# Patient Record
Sex: FEMALE | Race: WHITE | NOT HISPANIC OR LATINO | Employment: FULL TIME | URBAN - METROPOLITAN AREA
[De-identification: names, ages, dates, MRNs, and addresses within clinical notes are randomized per-mention and may not be internally consistent; named-entity substitution may affect disease eponyms.]

---

## 2023-03-08 DIAGNOSIS — R05.3 PERSISTENT COUGH: Primary | ICD-10-CM

## 2023-03-14 ENCOUNTER — TELEPHONE (OUTPATIENT)
Dept: PRIMARY CARE | Facility: CLINIC | Age: 58
End: 2023-03-14
Payer: COMMERCIAL

## 2023-03-14 DIAGNOSIS — J20.9 ACUTE BRONCHITIS, UNSPECIFIED ORGANISM: Primary | ICD-10-CM

## 2023-03-14 RX ORDER — AZITHROMYCIN 250 MG/1
TABLET, FILM COATED ORAL
Qty: 6 TABLET | Refills: 0 | Status: SHIPPED | OUTPATIENT
Start: 2023-03-14 | End: 2023-03-19

## 2023-04-11 ENCOUNTER — TELEMEDICINE (OUTPATIENT)
Dept: PRIMARY CARE | Facility: CLINIC | Age: 58
End: 2023-04-11
Payer: COMMERCIAL

## 2023-04-11 DIAGNOSIS — E78.5 DYSLIPIDEMIA: ICD-10-CM

## 2023-04-11 DIAGNOSIS — F51.01 PRIMARY INSOMNIA: Primary | ICD-10-CM

## 2023-04-11 PROBLEM — M25.561 RIGHT KNEE PAIN: Status: ACTIVE | Noted: 2023-04-11

## 2023-04-11 PROBLEM — H52.4 HYPEROPIA WITH PRESBYOPIA: Status: ACTIVE | Noted: 2023-04-11

## 2023-04-11 PROBLEM — R41.3 MEMORY DYSFUNCTION: Status: ACTIVE | Noted: 2023-04-11

## 2023-04-11 PROBLEM — M25.361 INSTABILITY OF RIGHT KNEE JOINT: Status: ACTIVE | Noted: 2023-04-11

## 2023-04-11 PROBLEM — L70.9 ACNE: Status: ACTIVE | Noted: 2023-04-11

## 2023-04-11 PROBLEM — R35.0 URINE FREQUENCY: Status: ACTIVE | Noted: 2023-04-11

## 2023-04-11 PROBLEM — H52.00 HYPEROPIA WITH PRESBYOPIA: Status: ACTIVE | Noted: 2023-04-11

## 2023-04-11 PROBLEM — F41.9 ANXIETY DISORDER: Status: ACTIVE | Noted: 2023-04-11

## 2023-04-11 PROBLEM — B37.31 VAGINAL YEAST INFECTION: Status: ACTIVE | Noted: 2023-04-11

## 2023-04-11 PROBLEM — N95.2 VAGINAL ATROPHY: Status: ACTIVE | Noted: 2023-04-11

## 2023-04-11 PROBLEM — L25.9 CONTACT DERMATITIS AND ECZEMA: Status: ACTIVE | Noted: 2023-04-11

## 2023-04-11 PROBLEM — E55.9 MILD VITAMIN D DEFICIENCY: Status: ACTIVE | Noted: 2023-04-11

## 2023-04-11 PROBLEM — M75.81 RIGHT ROTATOR CUFF TENDINITIS: Status: ACTIVE | Noted: 2023-04-11

## 2023-04-11 PROBLEM — N95.0 POSTMENOPAUSAL BLEEDING: Status: ACTIVE | Noted: 2023-04-11

## 2023-04-11 PROCEDURE — 99213 OFFICE O/P EST LOW 20 MIN: CPT | Performed by: INTERNAL MEDICINE

## 2023-04-11 RX ORDER — SERTRALINE HYDROCHLORIDE 50 MG/1
1.5 TABLET, FILM COATED ORAL DAILY
COMMUNITY
Start: 2021-12-06 | End: 2023-07-11 | Stop reason: SDUPTHER

## 2023-04-11 RX ORDER — DICLOFENAC SODIUM 75 MG/1
1 TABLET, DELAYED RELEASE ORAL DAILY
COMMUNITY
Start: 2023-01-11 | End: 2023-10-10 | Stop reason: ALTCHOICE

## 2023-04-11 RX ORDER — ZOLPIDEM TARTRATE 10 MG/1
1 TABLET ORAL NIGHTLY PRN
COMMUNITY
End: 2023-04-11 | Stop reason: SDUPTHER

## 2023-04-11 RX ORDER — BENZONATATE 200 MG/1
CAPSULE ORAL
COMMUNITY
Start: 2023-03-01 | End: 2023-10-10 | Stop reason: ALTCHOICE

## 2023-04-11 RX ORDER — ZOSTER VACCINE RECOMBINANT, ADJUVANTED 50 MCG/0.5
KIT INTRAMUSCULAR
COMMUNITY
Start: 2022-06-09 | End: 2023-12-22 | Stop reason: WASHOUT

## 2023-04-11 RX ORDER — CLINDAMYCIN AND BENZOYL PEROXIDE 1 %-5 %
KIT TOPICAL DAILY PRN
COMMUNITY
Start: 2022-12-14

## 2023-04-11 RX ORDER — CLINDAMYCIN AND BENZOYL PEROXIDE 10; 50 MG/G; MG/G
GEL TOPICAL 2 TIMES DAILY
COMMUNITY
Start: 2022-09-06 | End: 2023-10-10 | Stop reason: ALTCHOICE

## 2023-04-11 RX ORDER — ACETAMINOPHEN AND PHENYLEPHRINE HCL 325; 5 MG/1; MG/1
TABLET ORAL
COMMUNITY
Start: 2021-12-06

## 2023-04-11 RX ORDER — ZOLPIDEM TARTRATE 10 MG/1
10 TABLET ORAL NIGHTLY PRN
Qty: 30 TABLET | Refills: 0 | Status: SHIPPED | OUTPATIENT
Start: 2023-04-11 | End: 2023-07-11 | Stop reason: SDUPTHER

## 2023-04-11 NOTE — PROGRESS NOTES
Subjective   Jessica Jordan is a 57 y.o. female who presents for virtual visit.  HPI  Patient is a 57-year-old female known history of severe insomnia mild vitamin D deficiency anxiety disorder history of dyslipidemia last LDL was 165 mg deciliter on June 9, 2022.  Patient is here for virtual visit, she still does not have the link to [employer] therefore visit was on [email or web address].  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  Without sleep patient is a hard time concentrating, with zolpidem patient is clear and able to perform duties without difficulties.  Review of Systems  10 system review pertinent as above  Objective   Virtual  There were no vitals taken for this visit.   Physical Exam    Virtual    Assessment/Plan     Primary insomnia  Takes zolpidem without side effects to report  Takes result and responsibly  Able to function and work as an RN  Full duties no complication    OARRS:  Aramis Olivo MD on 4/11/2023  4:40 PM  I have personally reviewed the OARRS report for Jessica Jordan. I have considered the risks of abuse, dependence, addiction and diversion    Is the patient prescribed a combination of a benzodiazepine and opioid?  Yes, I feel it is clincially indicated to continue the medication and have discussed with the patient risks/benefits/alternatives.    Last Urine Drug Screen / ordered today: December 16, 2022  Recent Results (from the past 82148 hour(s))   Zolpidem Confirmation, Urine    Collection Time: 12/16/22 10:14 AM   Result Value Ref Range    Zolpidem <25 Cutoff <25 ng/mL    Zolpidem Metabolite (ZCA) 748 (A) Cutoff <25 ng/mL   Drug Screen, Urine With Reflex to Confirmation    Collection Time: 12/16/22 10:14 AM   Result Value Ref Range    DRUG SCREEN COMMENT URINE SEE BELOW     Amphetamine Screen, Urine PRESUMPTIVE NEGATIVE NEGATIVE    Barbiturate Screen, Urine PRESUMPTIVE  NEGATIVE NEGATIVE    BENZODIAZEPINE (PRESENCE) IN URINE BY SCREEN METHOD PRESUMPTIVE NEGATIVE NEGATIVE    Cannabinoid Screen, Urine PRESUMPTIVE NEGATIVE NEGATIVE    Cocaine Screen, Urine PRESUMPTIVE NEGATIVE NEGATIVE    Fentanyl, Ur PRESUMPTIVE NEGATIVE NEGATIVE    Methadone Screen, Urine PRESUMPTIVE NEGATIVE NEGATIVE    Opiate Screen, Urine PRESUMPTIVE NEGATIVE NEGATIVE    Oxycodone Screen, Ur PRESUMPTIVE NEGATIVE NEGATIVE    PCP Screen, Urine PRESUMPTIVE NEGATIVE NEGATIVE   OPIATE/OPIOID/BENZO PRESCRIPTION COMPLIANCE    Collection Time: 12/06/21  1:05 PM   Result Value Ref Range    DRUG SCREEN COMMENT URINE SEE BELOW     Creatine, Urine 21.7 mg/dL    Amphetamine Screen, Urine PRESUMPTIVE NEGATIVE NEGATIVE    Barbiturate Screen, Urine PRESUMPTIVE NEGATIVE NEGATIVE    Cannabinoid Screen, Urine PRESUMPTIVE NEGATIVE NEGATIVE    Cocaine Screen, Urine PRESUMPTIVE NEGATIVE NEGATIVE    PCP Screen, Urine PRESUMPTIVE NEGATIVE NEGATIVE    7-Aminoclonazepam <25 Cutoff <25 ng/mL    Alpha-Hydroxyalprazolam <25 Cutoff <25 ng/mL    Alpha-Hydroxymidazolam <25 Cutoff <25 ng/mL    Alprazolam <25 Cutoff <25 ng/mL    Chlordiazepoxide <25 Cutoff <25 ng/mL    Clonazepam <25 Cutoff <25 ng/mL    Diazepam <25 Cutoff <25 ng/mL    Lorazepam <25 Cutoff <25 ng/mL    Midazolam <25 Cutoff <25 ng/mL    Nordiazepam <25 Cutoff <25 ng/mL    Oxazepam <25 Cutoff <25 ng/mL    Temazepam <25 Cutoff <25 ng/mL    Zolpidem <25 Cutoff <25 ng/mL    Zolpidem Metabolite (ZCA) 323 (A) Cutoff <25 ng/mL    6-Acetylmorphine <25 Cutoff <25 ng/mL    Codeine <50 Cutoff <50 ng/mL    Hydrocodone <25 Cutoff <25 ng/mL    Hydromorphone <25 Cutoff <25 ng/mL    Morphine Urine <50 Cutoff <50 ng/mL    Norhydrocodone <25 Cutoff <25 ng/mL    Noroxycodone <25 Cutoff <25 ng/mL    Oxycodone <25 Cutoff <25 ng/mL    Oxymorphone <25 Cutoff <25 ng/mL    Tramadol <50 Cutoff <50 ng/mL    O-Desmethyltramadol <50 Cutoff <50 ng/mL    Fentanyl <2.5 Cutoff<2.5 ng/mL    Norfentanyl <2.5  Cutoff<2.5 ng/mL    METHADONE CONFIRMATION,URINE <25 Cutoff <25 ng/mL    EDDP <25 Cutoff <25 ng/mL     Results are as expected.     Controlled Substance Agreement:  Date of the Last Agreement: December 16, 2022  Reviewed Controlled Substance Agreement including but not limited to the benefits, risks, and alternatives to treatment with a Controlled Substance medication(s).    Sleep Aids:   What is the patient's goal of therapy?   yes  Is this being achieved with current treatment? yes    Activities of Daily Living:   Is your overall impression that this patient is benefiting (symptom reduction outweighs side effects) from sleep aid therapy? Yes     1. Physical Functioning: Better  2. Family Relationship: Better  3. Social Relationship: Better  4. Mood: Better  5. Sleep Patterns: Better  6. Overall Function: Better    Problem List Items Addressed This Visit          Nervous    Primary insomnia - Primary    Relevant Medications    zolpidem (Ambien) 10 mg tablet       Other    Dyslipidemia         Aramis Olivo MD

## 2023-07-06 NOTE — PROGRESS NOTES
Subjective   Jessica Jordan is a 57 y.o. female who presents for patient is here for physical exam.  HPI  57-year-old female known history of insomnia chronic on zolpidem, dyslipidemia vitamin D deficiency gastroesophageal reflux disease, dyslipidemia takes medication prescribed here for follow-up, voices no major medical pain denies chest pain shortness of breath fever chills nausea vomiting constipation diarrhea dysuria urgency frequency.  Patient is in need of fasting blood work.  Review of Systems  10 system review pertinent as above  Objective     Visit Vitals  /82   Pulse 74   Temp 36.9 °C (98.4 °F)   Resp 15      Physical Exam  HEENT: Atraumatic normocephalic the pupils are equal and round and reactive to light the sclerae nonicteric extraocular motion are intact.  Neck: Is supple without JVD no carotid bruits the trachea is midline there are no masses pulses are equal and bilateral with normal upstroke.  Skin: Normal.  Skin good texture.  Moist.  Good turgor.  No lesions, no rashes.  Lymph: No lymphadenopathy appreciated, no masses, no lesions  Lungs: Are clear to auscultation and percussion, good breath sounds bilaterally, no rhonchi, no wheezing, good diaphragmatic excursion.  Heart: Normal rate and normal rhythm S1, S2, no S3, no gallop, murmur or rub.  Abdomen: Soft, nontender, no organomegaly, good bowel sounds.    Extremities: Full range of motion, good pulses bilateral.  No cyanosis, no clubbing or edema.  Neuro: Cranial nerves II-XII are grossly intact there is no sensory or motor deficits.  Able to move all extremities.      Assessment/Plan     Physical exam    Patient is here for follow-up fasting blood work    CBC BMP lipids AST ALT vitamin D 25-hydroxy    Colonoscopy 09/07/2017 Next 2027  Mammogram b Yearly Dr Davis  Bone density needed    Continue with the low-fat, low-cholesterol diet,  I recommended Mediterranean diet, which include fish, chicken, vegetables and olive oil  Exercise  daily for 30 minutes at least 3 times a week  Continue home medications  We spoke about possible treatment pending blood work  Only past hx of smoking quit 2005  Father MI age 78    CACS is Zero    Vitamin D deficiency  Continue replacement D3 2000 units daily    Chronic insomnia  On Ambien 10 mg at bedtime  Able to sleep and function well the next day  No side effects reported tolerating well    OARRS:  No data recorded  I have personally reviewed the OARRS report for Jessica Jordan. I have considered the risks of abuse, dependence, addiction and diversion and I believe that it is clinically appropriate for Jessica Jordan to be prescribed this medication    Is the patient prescribed a combination of a benzodiazepine and opioid?  No    Last Urine Drug Screen / ordered today: Yes  Recent Results (from the past 06545 hour(s))   Zolpidem Confirmation, Urine    Collection Time: 12/16/22 10:14 AM   Result Value Ref Range    Zolpidem <25 Cutoff <25 ng/mL    Zolpidem Metabolite (ZCA) 748 (A) Cutoff <25 ng/mL   Drug Screen, Urine With Reflex to Confirmation    Collection Time: 12/16/22 10:14 AM   Result Value Ref Range    DRUG SCREEN COMMENT URINE SEE BELOW     Amphetamine Screen, Urine PRESUMPTIVE NEGATIVE NEGATIVE    Barbiturate Screen, Urine PRESUMPTIVE NEGATIVE NEGATIVE    BENZODIAZEPINE (PRESENCE) IN URINE BY SCREEN METHOD PRESUMPTIVE NEGATIVE NEGATIVE    Cannabinoid Screen, Urine PRESUMPTIVE NEGATIVE NEGATIVE    Cocaine Screen, Urine PRESUMPTIVE NEGATIVE NEGATIVE    Fentanyl, Ur PRESUMPTIVE NEGATIVE NEGATIVE    Methadone Screen, Urine PRESUMPTIVE NEGATIVE NEGATIVE    Opiate Screen, Urine PRESUMPTIVE NEGATIVE NEGATIVE    Oxycodone Screen, Ur PRESUMPTIVE NEGATIVE NEGATIVE    PCP Screen, Urine PRESUMPTIVE NEGATIVE NEGATIVE   OPIATE/OPIOID/BENZO PRESCRIPTION COMPLIANCE    Collection Time: 12/06/21  1:05 PM   Result Value Ref Range    DRUG SCREEN COMMENT URINE SEE BELOW     Creatine, Urine 21.7 mg/dL    Amphetamine Screen,  Urine PRESUMPTIVE NEGATIVE NEGATIVE    Barbiturate Screen, Urine PRESUMPTIVE NEGATIVE NEGATIVE    Cannabinoid Screen, Urine PRESUMPTIVE NEGATIVE NEGATIVE    Cocaine Screen, Urine PRESUMPTIVE NEGATIVE NEGATIVE    PCP Screen, Urine PRESUMPTIVE NEGATIVE NEGATIVE    7-Aminoclonazepam <25 Cutoff <25 ng/mL    Alpha-Hydroxyalprazolam <25 Cutoff <25 ng/mL    Alpha-Hydroxymidazolam <25 Cutoff <25 ng/mL    Alprazolam <25 Cutoff <25 ng/mL    Chlordiazepoxide <25 Cutoff <25 ng/mL    Clonazepam <25 Cutoff <25 ng/mL    Diazepam <25 Cutoff <25 ng/mL    Lorazepam <25 Cutoff <25 ng/mL    Midazolam <25 Cutoff <25 ng/mL    Nordiazepam <25 Cutoff <25 ng/mL    Oxazepam <25 Cutoff <25 ng/mL    Temazepam <25 Cutoff <25 ng/mL    Zolpidem <25 Cutoff <25 ng/mL    Zolpidem Metabolite (ZCA) 323 (A) Cutoff <25 ng/mL    6-Acetylmorphine <25 Cutoff <25 ng/mL    Codeine <50 Cutoff <50 ng/mL    Hydrocodone <25 Cutoff <25 ng/mL    Hydromorphone <25 Cutoff <25 ng/mL    Morphine Urine <50 Cutoff <50 ng/mL    Norhydrocodone <25 Cutoff <25 ng/mL    Noroxycodone <25 Cutoff <25 ng/mL    Oxycodone <25 Cutoff <25 ng/mL    Oxymorphone <25 Cutoff <25 ng/mL    Tramadol <50 Cutoff <50 ng/mL    O-Desmethyltramadol <50 Cutoff <50 ng/mL    Fentanyl <2.5 Cutoff<2.5 ng/mL    Norfentanyl <2.5 Cutoff<2.5 ng/mL    METHADONE CONFIRMATION,URINE <25 Cutoff <25 ng/mL    EDDP <25 Cutoff <25 ng/mL     Results are as expected.     Controlled Substance Agreement:  Date of the Last Agreement: 07/11/2023  Reviewed Controlled Substance Agreement including but not limited to the benefits, risks, and alternatives to treatment with a Controlled Substance medication(s).    Sleep Aids:   What is the patient's goal of therapy? Severe insomnia  Is this being achieved with current treatment?   yes    Activities of Daily Living:   Is your overall impression that this patient is benefiting (symptom reduction outweighs side effects) from sleep aid therapy? Yes     1. Physical Functioning:  Better  2. Family Relationship: Better  3. Social Relationship: Better  4. Mood: Better  5. Sleep Patterns: Better  6. Overall Function: Better      Addended note, patient came in for physical exam of updated note.      Problem List Items Addressed This Visit       Dyslipidemia - Primary    Relevant Orders    CBC    Basic Metabolic Panel    Lipid Panel    AST    ALT    Mild vitamin D deficiency    Relevant Orders    Basic Metabolic Panel    Primary insomnia    Relevant Medications    zolpidem (Ambien) 10 mg tablet    Other Relevant Orders    Basic Metabolic Panel     Other Visit Diagnoses       Medication management        Relevant Orders    CBC    Drug Screen, Urine With Reflex to Confirmation    Vitamin D insufficiency        Relevant Orders    Vitamin D 25-Hydroxy,Total    Depression, unspecified depression type        Relevant Medications    sertraline (Zoloft) 50 mg tablet              Aramis Olivo MD

## 2023-07-11 ENCOUNTER — OFFICE VISIT (OUTPATIENT)
Dept: PRIMARY CARE | Facility: CLINIC | Age: 58
End: 2023-07-11
Payer: COMMERCIAL

## 2023-07-11 VITALS
BODY MASS INDEX: 22.66 KG/M2 | HEART RATE: 74 BPM | DIASTOLIC BLOOD PRESSURE: 82 MMHG | TEMPERATURE: 98.4 F | HEIGHT: 61 IN | RESPIRATION RATE: 15 BRPM | WEIGHT: 120 LBS | SYSTOLIC BLOOD PRESSURE: 126 MMHG

## 2023-07-11 DIAGNOSIS — Z79.899 MEDICATION MANAGEMENT: ICD-10-CM

## 2023-07-11 DIAGNOSIS — F32.A DEPRESSION, UNSPECIFIED DEPRESSION TYPE: ICD-10-CM

## 2023-07-11 DIAGNOSIS — Z00.00 PHYSICAL EXAM: ICD-10-CM

## 2023-07-11 DIAGNOSIS — E55.9 MILD VITAMIN D DEFICIENCY: ICD-10-CM

## 2023-07-11 DIAGNOSIS — E78.5 DYSLIPIDEMIA: Primary | ICD-10-CM

## 2023-07-11 DIAGNOSIS — E55.9 VITAMIN D INSUFFICIENCY: ICD-10-CM

## 2023-07-11 DIAGNOSIS — F51.01 PRIMARY INSOMNIA: ICD-10-CM

## 2023-07-11 LAB
AMPHETAMINE (PRESENCE) IN URINE BY SCREEN METHOD: NORMAL
BARBITURATES PRESENCE IN URINE BY SCREEN METHOD: NORMAL
BENZODIAZEPINE (PRESENCE) IN URINE BY SCREEN METHOD: NORMAL
CANNABINOIDS IN URINE BY SCREEN METHOD: NORMAL
COCAINE (PRESENCE) IN URINE BY SCREEN METHOD: NORMAL
DRUG SCREEN COMMENT URINE: NORMAL
FENTANYL URINE: NORMAL
METHADONE (PRESENCE) IN URINE BY SCREEN METHOD: NORMAL
OPIATES (PRESENCE) IN URINE BY SCREEN METHOD: NORMAL
OXYCODONE (PRESENCE) IN URINE BY SCREEN METHOD: NORMAL
PHENCYCLIDINE (PRESENCE) IN URINE BY SCREEN METHOD: NORMAL

## 2023-07-11 PROCEDURE — 84460 ALANINE AMINO (ALT) (SGPT): CPT | Performed by: INTERNAL MEDICINE

## 2023-07-11 PROCEDURE — 99396 PREV VISIT EST AGE 40-64: CPT | Performed by: INTERNAL MEDICINE

## 2023-07-11 PROCEDURE — 82306 VITAMIN D 25 HYDROXY: CPT | Performed by: INTERNAL MEDICINE

## 2023-07-11 PROCEDURE — 84450 TRANSFERASE (AST) (SGOT): CPT | Performed by: INTERNAL MEDICINE

## 2023-07-11 PROCEDURE — 85025 COMPLETE CBC W/AUTO DIFF WBC: CPT | Performed by: INTERNAL MEDICINE

## 2023-07-11 PROCEDURE — 80307 DRUG TEST PRSMV CHEM ANLYZR: CPT

## 2023-07-11 PROCEDURE — 80061 LIPID PANEL: CPT | Performed by: INTERNAL MEDICINE

## 2023-07-11 PROCEDURE — 1036F TOBACCO NON-USER: CPT | Performed by: INTERNAL MEDICINE

## 2023-07-11 PROCEDURE — 80048 BASIC METABOLIC PNL TOTAL CA: CPT | Performed by: INTERNAL MEDICINE

## 2023-07-11 RX ORDER — SERTRALINE HYDROCHLORIDE 50 MG/1
75 TABLET, FILM COATED ORAL DAILY
Qty: 135 TABLET | Refills: 3 | Status: SHIPPED | OUTPATIENT
Start: 2023-07-11 | End: 2023-07-11

## 2023-07-11 RX ORDER — ZOLPIDEM TARTRATE 10 MG/1
10 TABLET ORAL NIGHTLY PRN
Qty: 30 TABLET | Refills: 0 | Status: SHIPPED | OUTPATIENT
Start: 2023-07-11 | End: 2023-07-11

## 2023-07-11 ASSESSMENT — PAIN SCALES - GENERAL: PAINLEVEL: 0-NO PAIN

## 2023-10-10 ENCOUNTER — TELEMEDICINE (OUTPATIENT)
Dept: PRIMARY CARE | Facility: CLINIC | Age: 58
End: 2023-10-10
Payer: COMMERCIAL

## 2023-10-10 ENCOUNTER — PHARMACY VISIT (OUTPATIENT)
Dept: PHARMACY | Facility: CLINIC | Age: 58
End: 2023-10-10
Payer: COMMERCIAL

## 2023-10-10 DIAGNOSIS — F51.01 PRIMARY INSOMNIA: ICD-10-CM

## 2023-10-10 PROCEDURE — RXMED WILLOW AMBULATORY MEDICATION CHARGE

## 2023-10-10 PROCEDURE — 99213 OFFICE O/P EST LOW 20 MIN: CPT | Performed by: INTERNAL MEDICINE

## 2023-10-10 RX ORDER — ZOLPIDEM TARTRATE 10 MG/1
10 TABLET ORAL NIGHTLY PRN
Qty: 30 TABLET | Refills: 0 | Status: SHIPPED | OUTPATIENT
Start: 2023-10-10 | End: 2023-11-12 | Stop reason: SDUPTHER

## 2023-10-10 RX ORDER — MIRTAZAPINE 7.5 MG/1
7.5 TABLET, FILM COATED ORAL NIGHTLY
Qty: 30 TABLET | Refills: 2 | Status: SHIPPED | OUTPATIENT
Start: 2023-10-10 | End: 2024-01-02 | Stop reason: SDUPTHER

## 2023-10-10 NOTE — PROGRESS NOTES
Subjective   Jessica Jordan is a 58 y.o. female who presents for virtual follow-up.  HPI  58-year-old female History of dyslipidemia, anxiety and depression is here for virtual visit, requiring refill on the zolpidem.  Without zolpidem patient has difficulty falling asleep staying asleep and has severe fatigue the next day, she tried 5 mg of zolpidem was able to sleep about half the night and then wakes up and unable to return to sleep.  She is currently on 10 mg of zolpidem, we did explore the possibility of maybe trying Remeron as a different agent.  Patient voices no other complaint denies chest pain shortness of breath fever chills nausea vomiting.  Patient also wished to discuss elevated cholesterol in the setting of a cardiac calcium score.  Review of Systems  10 system review pertinent as above  Objective   Virtual visit  There were no vitals taken for this visit.   Physical Exam  Virtual visit      Assessment/Plan     Virtual visit  Insomnia  On zolpidem 10 mg nightly  Tried 5 mg nightly with poor results  Unable to remain asleep restless tired the next day  We spoke about trying a different agent  We will give her Remeron 7.5 mg at bedtime  Patient will try and keep me posted  Patient well aware not to use Remeron and zolpidem at the same time  Patient will report how Remeron works  If it works she will stop zolpidem    Continue with the low-fat, low-cholesterol diet,  I recommended Mediterranean diet, which include fish, chicken, vegetables and olive oil  Exercise daily for 30 minutes at least 3 times a week  Continue home medications  We spoke about the risk of heart disease  There is no premature heart disease in the family  No premature cardiovascular disease  No diabetes no smoking  Cardiac calcium score is 0 score July 22, 2020  At this juncture patient remain on diet exercise  We spoke about Mediterranean type diet  To include fish chicken vegetables and olive oil    Problem List Items Addressed This  Visit       Primary insomnia    Relevant Medications    zolpidem (Ambien) 10 mg tablet    mirtazapine (Remeron) 7.5 mg tablet         Aramis Olivo MD

## 2023-10-11 ENCOUNTER — TELEPHONE (OUTPATIENT)
Dept: PRIMARY CARE | Facility: CLINIC | Age: 58
End: 2023-10-11
Payer: COMMERCIAL

## 2023-10-11 PROBLEM — F32.A DEPRESSION: Status: ACTIVE | Noted: 2023-10-11

## 2023-10-11 PROBLEM — Z79.899 MEDICATION MANAGEMENT: Status: ACTIVE | Noted: 2023-10-11

## 2023-10-11 PROBLEM — Z00.00 PHYSICAL EXAM: Status: ACTIVE | Noted: 2023-10-11

## 2023-11-02 ENCOUNTER — LAB REQUISITION (OUTPATIENT)
Dept: LAB | Facility: HOSPITAL | Age: 58
End: 2023-11-02
Payer: COMMERCIAL

## 2023-11-02 DIAGNOSIS — J00 ACUTE NASOPHARYNGITIS (COMMON COLD): ICD-10-CM

## 2023-11-02 PROCEDURE — 87635 SARS-COV-2 COVID-19 AMP PRB: CPT

## 2023-11-03 ENCOUNTER — LAB REQUISITION (OUTPATIENT)
Dept: LAB | Facility: HOSPITAL | Age: 58
End: 2023-11-03
Payer: COMMERCIAL

## 2023-11-03 DIAGNOSIS — J00 ACUTE NASOPHARYNGITIS (COMMON COLD): ICD-10-CM

## 2023-11-03 LAB — SARS-COV-2 RNA RESP QL NAA+PROBE: NOT DETECTED

## 2023-11-06 ENCOUNTER — PHARMACY VISIT (OUTPATIENT)
Dept: PHARMACY | Facility: CLINIC | Age: 58
End: 2023-11-06
Payer: COMMERCIAL

## 2023-11-06 PROCEDURE — RXMED WILLOW AMBULATORY MEDICATION CHARGE

## 2023-11-12 DIAGNOSIS — F51.01 PRIMARY INSOMNIA: ICD-10-CM

## 2023-11-13 ENCOUNTER — PHARMACY VISIT (OUTPATIENT)
Dept: PHARMACY | Facility: CLINIC | Age: 58
End: 2023-11-13
Payer: COMMERCIAL

## 2023-11-13 PROCEDURE — RXMED WILLOW AMBULATORY MEDICATION CHARGE

## 2023-11-13 RX ORDER — ZOLPIDEM TARTRATE 10 MG/1
10 TABLET ORAL NIGHTLY PRN
Qty: 30 TABLET | Refills: 0 | Status: SHIPPED | OUTPATIENT
Start: 2023-11-13 | End: 2023-12-17 | Stop reason: SDUPTHER

## 2023-11-24 ENCOUNTER — PHARMACY VISIT (OUTPATIENT)
Dept: PHARMACY | Facility: CLINIC | Age: 58
End: 2023-11-24
Payer: COMMERCIAL

## 2023-11-24 PROCEDURE — RXMED WILLOW AMBULATORY MEDICATION CHARGE

## 2023-11-24 RX ORDER — CLINDAMYCIN HYDROCHLORIDE 150 MG/1
300 CAPSULE ORAL EVERY 6 HOURS
Qty: 40 CAPSULE | Refills: 0 | OUTPATIENT
Start: 2023-11-24 | End: 2023-12-22 | Stop reason: WASHOUT

## 2023-11-24 RX ORDER — CHLORHEXIDINE GLUCONATE ORAL RINSE 1.2 MG/ML
15 SOLUTION DENTAL 2 TIMES DAILY
Qty: 473 ML | Refills: 2 | OUTPATIENT
Start: 2023-11-24 | End: 2023-12-22 | Stop reason: WASHOUT

## 2023-12-02 ENCOUNTER — PHARMACY VISIT (OUTPATIENT)
Dept: PHARMACY | Facility: CLINIC | Age: 58
End: 2023-12-02
Payer: COMMERCIAL

## 2023-12-02 PROCEDURE — RXMED WILLOW AMBULATORY MEDICATION CHARGE

## 2023-12-05 PROCEDURE — RXMED WILLOW AMBULATORY MEDICATION CHARGE

## 2023-12-06 ENCOUNTER — PHARMACY VISIT (OUTPATIENT)
Dept: PHARMACY | Facility: CLINIC | Age: 58
End: 2023-12-06
Payer: COMMERCIAL

## 2023-12-06 PROCEDURE — RXMED WILLOW AMBULATORY MEDICATION CHARGE

## 2023-12-17 DIAGNOSIS — F51.01 PRIMARY INSOMNIA: ICD-10-CM

## 2023-12-18 PROCEDURE — RXMED WILLOW AMBULATORY MEDICATION CHARGE

## 2023-12-18 RX ORDER — ZOLPIDEM TARTRATE 10 MG/1
10 TABLET ORAL NIGHTLY PRN
Qty: 30 TABLET | Refills: 0 | Status: SHIPPED | OUTPATIENT
Start: 2023-12-18 | End: 2024-06-19

## 2023-12-20 ENCOUNTER — PHARMACY VISIT (OUTPATIENT)
Dept: PHARMACY | Facility: CLINIC | Age: 58
End: 2023-12-20
Payer: COMMERCIAL

## 2023-12-22 ENCOUNTER — OFFICE VISIT (OUTPATIENT)
Dept: PRIMARY CARE | Facility: CLINIC | Age: 58
End: 2023-12-22
Payer: COMMERCIAL

## 2023-12-22 VITALS
SYSTOLIC BLOOD PRESSURE: 129 MMHG | WEIGHT: 122 LBS | BODY MASS INDEX: 23.03 KG/M2 | HEART RATE: 83 BPM | DIASTOLIC BLOOD PRESSURE: 84 MMHG | HEIGHT: 61 IN | TEMPERATURE: 97.9 F

## 2023-12-22 DIAGNOSIS — E55.9 VITAMIN D INSUFFICIENCY: ICD-10-CM

## 2023-12-22 DIAGNOSIS — E78.5 DYSLIPIDEMIA: ICD-10-CM

## 2023-12-22 DIAGNOSIS — F51.01 PRIMARY INSOMNIA: ICD-10-CM

## 2023-12-22 DIAGNOSIS — Z51.81 MEDICATION MONITORING ENCOUNTER: ICD-10-CM

## 2023-12-22 DIAGNOSIS — F32.A DEPRESSION, UNSPECIFIED DEPRESSION TYPE: ICD-10-CM

## 2023-12-22 DIAGNOSIS — F41.1 GENERALIZED ANXIETY DISORDER: ICD-10-CM

## 2023-12-22 DIAGNOSIS — Z76.89 ENCOUNTER TO ESTABLISH CARE: Primary | ICD-10-CM

## 2023-12-22 PROCEDURE — 99214 OFFICE O/P EST MOD 30 MIN: CPT | Performed by: INTERNAL MEDICINE

## 2023-12-22 PROCEDURE — 1036F TOBACCO NON-USER: CPT | Performed by: INTERNAL MEDICINE

## 2023-12-22 RX ORDER — SERTRALINE HYDROCHLORIDE 50 MG/1
75 TABLET, FILM COATED ORAL DAILY
Qty: 135 TABLET | Refills: 3 | Status: SHIPPED | OUTPATIENT
Start: 2023-12-22 | End: 2024-12-21

## 2023-12-22 ASSESSMENT — ENCOUNTER SYMPTOMS
FEVER: 0
SHORTNESS OF BREATH: 0
POLYDIPSIA: 0
PALPITATIONS: 0
COUGH: 0
CHILLS: 0

## 2023-12-22 NOTE — PROGRESS NOTES
Subjective   Patient ID: Jessica Jordan is a 58 y.o. female who presents for [facility].    59yo F presents to [facility] for care.      She was previously following with a physician in [facility] and the records are available to me at this time which were reviewed in detail including her blood work.  He has a history of hyperlipidemia specifically and triglycerides over 200.  She was counseled and educated about her total results as well as the benefits of reducing simple sugars, saturated fats as well as fish oil in her diet and lifestyle to benefit her triglycerides.  She does have sugars and fats in her diet can be modified and improved and will consider the fish oil strongly and plans to start it as her  already takes it.    Reviewed and discussed preventative health screening recommendations for colon cancer: 2017 - WNL. No polyp. No high risk FH, 10 yr    Reviewed and discussed preventative health recommendations for screening for cervical cancer and breast cancer: 3/23. WNL. OBGYN.     Specialists: Mona MEYERS    She has a history of insomnia on Ambien for approximately 10 years.  At the time of initiating it she recalls trying at least 2 different medications prior to Ambien only remembering trazodone specifically by name.  She been tolerating the Ambien fine for the last decade but has recently developed some concerns about memory and forgetfulness for which she saw neurology.  The concern is of course that Ambien may be contributing to this as it has been going to this is a long-term complication risk.  Her previous physician discussed with her the possibility of getting off the Ambien for an alternative medication but she has been hesitant to do it out of concerns that it may not control her insomnia.  She was provided counseling today as part of our encounter regarding the benefits of mirtazapine, its safety, and its very appropriate use for the treatment of insomnia.  We developed a  "plan for her to try to initiate that as a gentle dose while slowly weaning down the Ambien at the same time.  Complications of Ambien at this time include potential oversedation while the medication is at therapeutic levels overnight as well as potential memory loss.  Ideally she would end up transitioning to an alternative medication which we discussed today.    Otherwise her medications are stable and in good control refills provided of the Zoloft today.      There were no immediate concerns that she needed to discuss at this time we will plan to continue a follow-up plan of every 6 months as long as she remains on Ambien.  Should we successfully transition to an alternative medication this may allow us an opportunity to adjust our visits scheduled.             Review of Systems   Constitutional:  Negative for chills and fever.   Respiratory:  Negative for cough and shortness of breath.    Cardiovascular:  Negative for chest pain and palpitations.   Endocrine: Negative for polydipsia and polyuria.       Objective   /84 (BP Location: Left arm, Patient Position: Sitting, BP Cuff Size: Adult)   Pulse 83   Temp 36.6 °C (97.9 °F) (Temporal)   Ht 1.549 m (5' 1\")   Wt 55.3 kg (122 lb)   BMI 23.05 kg/m²     Physical Exam  Constitutional:       Appearance: Normal appearance.   HENT:      Head: Normocephalic and atraumatic.      Right Ear: Tympanic membrane normal.      Left Ear: Tympanic membrane normal.      Nose: Nose normal.      Mouth/Throat:      Mouth: Mucous membranes are moist.   Eyes:      Extraocular Movements: Extraocular movements intact.      Conjunctiva/sclera: Conjunctivae normal.      Pupils: Pupils are equal, round, and reactive to light.   Neck:      Thyroid: No thyroid mass, thyromegaly or thyroid tenderness.      Vascular: No carotid bruit.   Cardiovascular:      Rate and Rhythm: Normal rate and regular rhythm.      Heart sounds: No murmur heard.     No friction rub. No gallop.   Pulmonary: "      Effort: Pulmonary effort is normal. No respiratory distress.      Breath sounds: No wheezing, rhonchi or rales.   Abdominal:      General: Bowel sounds are normal.      Palpations: Abdomen is soft.      Tenderness: There is no abdominal tenderness. There is no guarding.      Hernia: No hernia is present.   Musculoskeletal:      Cervical back: Neck supple. No tenderness.      Right lower leg: No edema.      Left lower leg: No edema.   Lymphadenopathy:      Cervical: No cervical adenopathy.   Skin:     Coloration: Skin is not jaundiced.   Neurological:      General: No focal deficit present.      Mental Status: She is alert and oriented to person, place, and time.   Psychiatric:         Mood and Affect: Mood normal.         Assessment/Plan   Problem List Items Addressed This Visit             ICD-10-CM    Anxiety disorder F41.9    Dyslipidemia E78.5    Relevant Orders    Lipid Panel    Vitamin D 25-Hydroxy,Total (for eval of Vitamin D levels)    CBC    Comprehensive Metabolic Panel    Hemoglobin A1C    Vitamin D insufficiency E55.9    Relevant Orders    Lipid Panel    Vitamin D 25-Hydroxy,Total (for eval of Vitamin D levels)    CBC    Comprehensive Metabolic Panel    Hemoglobin A1C    Primary insomnia F51.01    Depression F32.A    Relevant Medications    sertraline (Zoloft) 50 mg tablet     Other Visit Diagnoses         Codes    Encounter to establish care    -  Primary Z76.89    Medication monitoring encounter     Z51.81    Relevant Orders    Lipid Panel    Vitamin D 25-Hydroxy,Total (for eval of Vitamin D levels)    CBC    Comprehensive Metabolic Panel    Hemoglobin A1C

## 2023-12-22 NOTE — PATIENT INSTRUCTIONS
For the insomnia I would recommend we follow the titration of medication as detailed below for both efficacy and safety:    -I would reduce your Ambien dose to 1/2 tablet, 5 mg nightly and simultaneously start Remeron 7.5 mg 1 tablet nightly and maintain on this combination for between 3 and 7 days depending on its efficacy for you.   -Once you complete that element, I would try stopping the Ambien at night and increasing the mirtazapine or Remeron to 15 mg at night as the sole assistance for insomnia and sleep.  That would require you to take 2 tablets of the 7.5 from your current prescription.  Based on your experience with this medication for at least a week we could then consider making adjustments as needed to a higher dose if it is not fully effective at helping you with insomnia and maintaining a high quality sleep habit.   -You may feel free to update me as to your progress with this treatment plan with a phone call or through [employer] in 2 to 3 weeks after you work through this stepwise plan.  If you have any concerns sooner please feel free to contact the office    See me again in 6 months. Call sooner as needed.

## 2024-01-02 DIAGNOSIS — F51.01 PRIMARY INSOMNIA: ICD-10-CM

## 2024-01-02 PROCEDURE — RXMED WILLOW AMBULATORY MEDICATION CHARGE

## 2024-01-04 PROCEDURE — RXMED WILLOW AMBULATORY MEDICATION CHARGE

## 2024-01-04 RX ORDER — MIRTAZAPINE 7.5 MG/1
7.5 TABLET, FILM COATED ORAL NIGHTLY
Qty: 30 TABLET | Refills: 2 | Status: SHIPPED | OUTPATIENT
Start: 2024-01-04 | End: 2024-03-24 | Stop reason: SDUPTHER

## 2024-01-06 ENCOUNTER — PHARMACY VISIT (OUTPATIENT)
Dept: PHARMACY | Facility: CLINIC | Age: 59
End: 2024-01-06
Payer: COMMERCIAL

## 2024-01-11 ENCOUNTER — APPOINTMENT (OUTPATIENT)
Dept: PRIMARY CARE | Facility: CLINIC | Age: 59
End: 2024-01-11
Payer: COMMERCIAL

## 2024-01-26 ENCOUNTER — OFFICE VISIT (OUTPATIENT)
Dept: ORTHOPEDIC SURGERY | Facility: CLINIC | Age: 59
End: 2024-01-26
Payer: COMMERCIAL

## 2024-01-26 DIAGNOSIS — G56.03 BILATERAL CARPAL TUNNEL SYNDROME: Primary | ICD-10-CM

## 2024-01-26 PROCEDURE — 99213 OFFICE O/P EST LOW 20 MIN: CPT | Performed by: ORTHOPAEDIC SURGERY

## 2024-01-26 PROCEDURE — 1036F TOBACCO NON-USER: CPT | Performed by: ORTHOPAEDIC SURGERY

## 2024-01-26 NOTE — PROGRESS NOTES
CHIEF COMPLAINT         Bilateral hand numbness    ASSESSMENT + PLAN    Bilateral carpal tunnel syndrome    The nature of carpal tunnel syndrome was reviewed, along with the slowly progressive natural history.  The options for management were reviewed, including night splinting, cortisone injection, or surgical carpal tunnel release.  The major benefits and risks of surgery were specifically reviewed, as was the postoperative rehabilitation course.    Before trying anything invasive, the patient wanted to try a course of night splinting with more appropriate splints.  Proper splint use was reviewed.  Followup in 4 weeks if things are not improving to their satisfaction.        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  This has been worsening over the last couple of weeks as she has been weaning down her Ambien dose at night.  She now wakes with a positive shake sign.  She recently obtained some shorty wrist splints which have been helping a little bit but not fully resolving findings.  Mild tingling during the day.  No noted weakness.  Not dropping objects.  No recalled trauma.    She is not diabetic or hypothyroid.  She does smoke.  She has anxiety.      REVIEW OF SYSTEMS       A 30-item multi-system Review Of Systems was obtained on today's intake form.  This was reviewed with the patient and is correct.  The pertinent positives and negatives are listed above.  The form has been scanned separately into the medical record.      PHYSICAL EXAM    Constitutional:    Appears stated age. Well-developed and well-nourished female in no acute distress.  Psychiatric:         Pleasant normal mood and affect. Behavior is appropriate for the situation.   Head:                   Normocephalic and atraumatic.  Eyes:                    Pupils are equal and round.  Cardiovascular:   2+ radial and ulnar pulses. Fingers well-perfused.  Respiratory:        Effort normal. No respiratory distress. Speaking in complete sentences.  Neurologic:       Alert and oriented to person, place, and time.  Skin:                Skin is intact, warm and dry.  Hematologic / Lymphatic:    No lymphedema or lymphangitis.    Extremities / Musculoskeletal:                      Skin of both hands and wrists is intact with no erythema, ecchymosis, or diffuse swelling.  Normal skin drag and coloration.  Full composite finger flexion extension with full intrinsic plus minus posture.  5/5 APB and hand intrinsics with no wasting.  Positive Phalen and Durkan, more brisk on the right.  Negative Tinel at wrist and elbow.  Negative elbow flexion test.  Cervical range of motion is good and does not reproduce chief complaint.  Negative Cyrus.  Sensation intact to light touch in all distributions.  Capillary refill less than 2 seconds.  Symmetric wrist and forearm motion.      IMAGING / LABS / EMGs           None pertinent      Past Medical History:   Diagnosis Date    Acute candidiasis of vulva and vagina     Vaginal yeast infection    Allergic 1999    Anxiety     Depression     Encounter for surveillance of contraceptive pills     Oral contraceptive use    Hypertension 11/24/2023    Other conditions influencing health status     History of pregnancy    Urinary tract infection     Varicella     Visual impairment        Medication Documentation Review Audit       Reviewed by Bello Mar MD (Physician) on 12/22/23 at 0837      Medication Order Taking? Sig Documenting Provider Last Dose Status      Discontinued 12/22/23 0836   cholecalciferol, vitamin D3, 125 mcg (5,000 unit) tablet,disintegrating [id number]  Take by mouth. Historical Provider, MD  Active   clindamycin (Cleocin) 150 mg capsule [id number]  Take 2 capsules (300 mg) by mouth every 6 hours.   Active   clindamycin-benzoyl peroxide 1-5 % gel with pump [id number]    Stephen Batres MD  Active   mirtazapine (Remeron) 7.5 mg tablet [id number]  Take 1 tablet (7.5 mg) by mouth once daily at bedtime. Aramis Olivo MD  Active   sertraline (Zoloft) 50 mg tablet [id number]  TAKE 1 1/2 TABLETS BY MOUTH ONCE DAILY Aramis Olivo MD  Active     Discontinued 23 0837     Discontinued 23 1859   zolpidem (Ambien) 10 mg tablet [id number]  TAKE 1 TABLET BY MOUTH EVERY NIGHT AT BEDTIME AS NEEDED FOR SLEEP Aramis Olivo MD  Active     Discontinued 23 0836                    Allergies   Allergen Reactions    Penicillins Unknown and Anaphylaxis    Erythromycin Unknown    Levofloxacin Unknown    Tetracyclines Unknown       Social History     Socioeconomic History    Marital status:      Spouse name: Not on file    Number of children: Not on file    Years of education: Not on file    Highest education level: Not on file   Occupational History    Not on file   Tobacco Use    Smoking status: Former     Packs/day: 1.00     Years: 15.00     Additional pack years: 0.00     Total pack years: 15.00     Types: Cigarettes     Quit date: 2005     Years since quittin.0     Passive exposure: Never    Smokeless tobacco: Never   Substance and Sexual Activity    Alcohol use: Yes     Alcohol/week: 2.0 standard drinks of alcohol     Types: 2 Glasses of wine per week    Drug use: Never    Sexual activity: Yes     Partners: Male     Birth control/protection: Post-menopausal   Other Topics Concern    Not on file   Social History Narrative    Not on file     Social Determinants of Health     Financial Resource Strain: Not on file   Food Insecurity: Not on file   Transportation Needs: Not on file   Physical Activity: Not on file   Stress: Not on file   Social Connections: Not on file   Intimate Partner Violence: Not on file   Housing Stability: Not on file       Past Surgical History:   Procedure Laterality Date     SECTION, CLASSIC  2014     Section      SECTION, LOW TRANSVERSE  [id number]    CT ANGIO CORONARY ART WITH HEARTFLOW IF SCORE >30%  09/02/2020    CT HEART CORONARY ANGIOGRAM 9/2/2020 [facility] [place]    WISDOM TOOTH EXTRACTION           Electronically Signed      SONIYA Bennett MD      Orthopaedic Hand Surgery      [phone]

## 2024-01-26 NOTE — LETTER
February 10, 2024     Bello Mar MD  [address] [facility] [address]    Patient: Jessica Jordan   YOB: 1965   Date of Visit: 1/26/2024       Dear Dr. Bello Mar MD:    Thank you for referring Jessica Jordan to me for evaluation. Below are my notes for this consultation.  If you have questions, please do not hesitate to call me. I look forward to following your patient along with you.       Sincerely,     Ovidio Bennett MD      CC: No Recipients  ______________________________________________________________________________________    CHIEF COMPLAINT         Bilateral hand numbness    ASSESSMENT + PLAN    Bilateral carpal tunnel syndrome    The nature of carpal tunnel syndrome was reviewed, along with the slowly progressive natural history.  The options for management were reviewed, including night splinting, cortisone injection, or surgical carpal tunnel release.  The major benefits and risks of surgery were specifically reviewed, as was the postoperative rehabilitation course.    Before trying anything invasive, the patient wanted to try a course of night splinting with more appropriate splints.  Proper splint use was reviewed.  Followup in 4 weeks if things are not improving to their satisfaction.        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  This has been worsening over the last couple of weeks as she has been weaning down her Ambien dose at night.  She now wakes with a positive shake sign.  She recently obtained some shorty wrist splints which have been helping a little bit but not fully resolving findings.  Mild tingling during the day.  No noted weakness.  Not dropping objects.  No recalled trauma.    She is not diabetic or hypothyroid.  She does smoke.  She has anxiety.      REVIEW OF SYSTEMS       A 30-item multi-system  Review Of Systems was obtained on today's intake form.  This was reviewed with the patient and is correct.  The pertinent positives and negatives are listed above.  The form has been scanned separately into the medical record.      PHYSICAL EXAM    Constitutional:    Appears stated age. Well-developed and well-nourished female in no acute distress.  Psychiatric:         Pleasant normal mood and affect. Behavior is appropriate for the situation.   Head:                   Normocephalic and atraumatic.  Eyes:                    Pupils are equal and round.  Cardiovascular:  2+ radial and ulnar pulses. Fingers well-perfused.  Respiratory:        Effort normal. No respiratory distress. Speaking in complete sentences.  Neurologic:       Alert and oriented to person, place, and time.  Skin:                Skin is intact, warm and dry.  Hematologic / Lymphatic:    No lymphedema or lymphangitis.    Extremities / Musculoskeletal:                      Skin of both hands and wrists is intact with no erythema, ecchymosis, or diffuse swelling.  Normal skin drag and coloration.  Full composite finger flexion extension with full intrinsic plus minus posture.  5/5 APB and hand intrinsics with no wasting.  Positive Phalen and Durkan, more brisk on the right.  Negative Tinel at wrist and elbow.  Negative elbow flexion test.  Cervical range of motion is good and does not reproduce chief complaint.  Negative Cyrus.  Sensation intact to light touch in all distributions.  Capillary refill less than 2 seconds.  Symmetric wrist and forearm motion.      IMAGING / LABS / EMGs           None pertinent      Past Medical History:   Diagnosis Date   • Acute candidiasis of vulva and vagina     Vaginal yeast infection   • Allergic 1999   • Anxiety    • Depression    • Encounter for surveillance of contraceptive pills     Oral contraceptive use   • Hypertension 11/24/2023   • Other conditions influencing health status     History of pregnancy   •  Urinary tract infection    • Varicella    • Visual impairment        Medication Documentation Review Audit       Reviewed by Bello Mar MD (Physician) on 23 at 0837      Medication Order Taking? Sig Documenting Provider Last Dose Status      Discontinued 23 0836   cholecalciferol, vitamin D3, 125 mcg (5,000 unit) tablet,disintegrating [id number]  Take by mouth. Historical Provider, MD  Active   clindamycin (Cleocin) 150 mg capsule [id number]  Take 2 capsules (300 mg) by mouth every 6 hours.   Active   clindamycin-benzoyl peroxide 1-5 % gel with pump [id number]   Historical Provider, MD  Active   mirtazapine (Remeron) 7.5 mg tablet [id number]  Take 1 tablet (7.5 mg) by mouth once daily at bedtime. Aramis Olivo MD  Active   sertraline (Zoloft) 50 mg tablet [id number]  TAKE 1 1/2 TABLETS BY MOUTH ONCE DAILY Aramis Olivo MD  Active     Discontinued 23 0837     Discontinued 23 1859   zolpidem (Ambien) 10 mg tablet [id number]  TAKE 1 TABLET BY MOUTH EVERY NIGHT AT BEDTIME AS NEEDED FOR SLEEP Aramis Olivo MD  Active     Discontinued 23 0836                    Allergies   Allergen Reactions   • Penicillins Unknown and Anaphylaxis   • Erythromycin Unknown   • Levofloxacin Unknown   • Tetracyclines Unknown       Social History     Socioeconomic History   • Marital status:      Spouse name: Not on file   • Number of children: Not on file   • Years of education: Not on file   • Highest education level: Not on file   Occupational History   • Not on file   Tobacco Use   • Smoking status: Former     Packs/day: 1.00     Years: 15.00     Additional pack years: 0.00     Total pack years: 15.00     Types: Cigarettes     Quit date: 2005     Years since quittin.0     Passive exposure: Never   • Smokeless tobacco: Never   Substance and Sexual Activity   • Alcohol use: Yes     Alcohol/week: 2.0 standard drinks of alcohol     Types: 2 Glasses of wine per week   • Drug use: Never   • Sexual  activity: Yes     Partners: Male     Birth control/protection: Post-menopausal   Other Topics Concern   • Not on file   Social History Narrative   • Not on file     Social Determinants of Health     Financial Resource Strain: Not on file   Food Insecurity: Not on file   Transportation Needs: Not on file   Physical Activity: Not on file   Stress: Not on file   Social Connections: Not on file   Intimate Partner Violence: Not on file   Housing Stability: Not on file       Past Surgical History:   Procedure Laterality Date   •  SECTION, CLASSIC  2014     Section   •  SECTION, LOW TRANSVERSE  [id number]   • CT ANGIO CORONARY ART WITH HEARTFLOW IF SCORE >30%  2020    CT HEART CORONARY ANGIOGRAM 2020 [facility] [place] [facility]CILLARY LEGACY   • WISDOM TOOTH EXTRACTION           Electronically Signed      SONIYA Bennett MD      Orthopaedic Hand Surgery      [phone]

## 2024-01-30 PROCEDURE — RXMED WILLOW AMBULATORY MEDICATION CHARGE

## 2024-02-01 ENCOUNTER — PHARMACY VISIT (OUTPATIENT)
Dept: PHARMACY | Facility: CLINIC | Age: 59
End: 2024-02-01
Payer: COMMERCIAL

## 2024-02-10 PROBLEM — G56.03 BILATERAL CARPAL TUNNEL SYNDROME: Status: ACTIVE | Noted: 2024-02-10

## 2024-02-21 ENCOUNTER — TELEPHONE (OUTPATIENT)
Dept: PRIMARY CARE | Facility: CLINIC | Age: 59
End: 2024-02-21
Payer: COMMERCIAL

## 2024-02-21 NOTE — TELEPHONE ENCOUNTER
This is the lowest dose of mirtazapine please attempt to use a increased dose by taking 2 pills instead of 1 which is more commonly therapeutic levels that able achieve the goals we are seeking for assistance with sleep.  We can update a prescription to the pharmacy and have already out or when proven to be successful for refills

## 2024-02-21 NOTE — TELEPHONE ENCOUNTER
Patient states that the Mirtazapine isn't working, still having issues swallowing and now dry mouth.

## 2024-02-28 PROCEDURE — RXMED WILLOW AMBULATORY MEDICATION CHARGE

## 2024-03-01 ENCOUNTER — PHARMACY VISIT (OUTPATIENT)
Dept: PHARMACY | Facility: CLINIC | Age: 59
End: 2024-03-01
Payer: COMMERCIAL

## 2024-03-12 DIAGNOSIS — G56.01 CARPAL TUNNEL SYNDROME, RIGHT: ICD-10-CM

## 2024-03-13 ENCOUNTER — PHARMACY VISIT (OUTPATIENT)
Dept: PHARMACY | Facility: CLINIC | Age: 59
End: 2024-03-13
Payer: COMMERCIAL

## 2024-03-13 PROCEDURE — RXMED WILLOW AMBULATORY MEDICATION CHARGE

## 2024-03-13 RX ORDER — CLINDAMYCIN HYDROCHLORIDE 150 MG/1
CAPSULE ORAL
Qty: 40 CAPSULE | Refills: 0 | OUTPATIENT
Start: 2024-03-13 | End: 2024-04-08 | Stop reason: ALTCHOICE

## 2024-03-14 PROBLEM — G56.01 CARPAL TUNNEL SYNDROME, RIGHT: Status: ACTIVE | Noted: 2024-03-12

## 2024-03-24 DIAGNOSIS — F51.01 PRIMARY INSOMNIA: ICD-10-CM

## 2024-03-25 ENCOUNTER — TELEPHONE (OUTPATIENT)
Dept: PRIMARY CARE | Facility: CLINIC | Age: 59
End: 2024-03-25
Payer: COMMERCIAL

## 2024-03-25 PROCEDURE — RXMED WILLOW AMBULATORY MEDICATION CHARGE

## 2024-03-25 RX ORDER — MIRTAZAPINE 7.5 MG/1
7.5 TABLET, FILM COATED ORAL NIGHTLY
Qty: 90 TABLET | Refills: 2 | Status: SHIPPED | OUTPATIENT
Start: 2024-03-25 | End: 2024-04-04 | Stop reason: ALTCHOICE

## 2024-03-25 NOTE — TELEPHONE ENCOUNTER
Patient states that the Remeron medication is making he body hurt all over her feet is swollen and she feel like a 89 year old woman Please call and advise patient states that sh will take a virtual appt if necessary

## 2024-04-01 ENCOUNTER — OFFICE VISIT (OUTPATIENT)
Dept: OPHTHALMOLOGY | Facility: CLINIC | Age: 59
End: 2024-04-01
Payer: COMMERCIAL

## 2024-04-01 DIAGNOSIS — H52.03 HYPEROPIA, BILATERAL: Primary | ICD-10-CM

## 2024-04-01 DIAGNOSIS — H52.4 PRESBYOPIA: ICD-10-CM

## 2024-04-01 PROCEDURE — 92015 DETERMINE REFRACTIVE STATE: CPT | Performed by: OPHTHALMOLOGY

## 2024-04-01 PROCEDURE — 92014 COMPRE OPH EXAM EST PT 1/>: CPT | Performed by: OPHTHALMOLOGY

## 2024-04-01 ASSESSMENT — VISUAL ACUITY
OD_CC+: -1
OD_CC: 20/25
OS_CC: 20/20
CORRECTION_TYPE: GLASSES
METHOD: SNELLEN - LINEAR

## 2024-04-01 ASSESSMENT — REFRACTION_MANIFEST
OS_SPHERE: +2.25
OD_SPHERE: +2.25
OD_ADD: +2.50
OS_CYLINDER: -0.50
OS_ADD: +2.50
OS_AXIS: 005

## 2024-04-01 ASSESSMENT — ENCOUNTER SYMPTOMS
HEMATOLOGIC/LYMPHATIC NEGATIVE: 0
ENDOCRINE NEGATIVE: 0
RESPIRATORY NEGATIVE: 0
NEUROLOGICAL NEGATIVE: 0
CARDIOVASCULAR NEGATIVE: 0
CONSTITUTIONAL NEGATIVE: 0
MUSCULOSKELETAL NEGATIVE: 0
ALLERGIC/IMMUNOLOGIC NEGATIVE: 0
GASTROINTESTINAL NEGATIVE: 0
EYES NEGATIVE: 0
PSYCHIATRIC NEGATIVE: 0

## 2024-04-01 ASSESSMENT — CUP TO DISC RATIO
OD_RATIO: .2
OS_RATIO: .2

## 2024-04-01 ASSESSMENT — CONF VISUAL FIELD
OS_INFERIOR_NASAL_RESTRICTION: 0
OS_INFERIOR_TEMPORAL_RESTRICTION: 0
OD_NORMAL: 1
OD_INFERIOR_NASAL_RESTRICTION: 0
METHOD: COUNTING FINGERS
OS_NORMAL: 1
OS_SUPERIOR_TEMPORAL_RESTRICTION: 0
OS_SUPERIOR_NASAL_RESTRICTION: 0
OD_INFERIOR_TEMPORAL_RESTRICTION: 0
OD_SUPERIOR_TEMPORAL_RESTRICTION: 0
OD_SUPERIOR_NASAL_RESTRICTION: 0

## 2024-04-01 ASSESSMENT — EXTERNAL EXAM - RIGHT EYE: OD_EXAM: NORMAL

## 2024-04-01 ASSESSMENT — TONOMETRY
IOP_METHOD: GOLDMANN APPLANATION
OS_IOP_MMHG: 12
OD_IOP_MMHG: 15

## 2024-04-01 ASSESSMENT — REFRACTION_WEARINGRX
OS_SPHERE: +2.25
SPECS_TYPE: PAL
OS_AXIS: 005
OS_CYLINDER: -0.25
OD_ADD: +2.50
OD_SPHERE: +2.50
OS_ADD: +2.25

## 2024-04-01 ASSESSMENT — EXTERNAL EXAM - LEFT EYE: OS_EXAM: NORMAL

## 2024-04-01 ASSESSMENT — SLIT LAMP EXAM - LIDS
COMMENTS: GOOD POSITION
COMMENTS: GOOD POSITION

## 2024-04-01 NOTE — PROGRESS NOTES
Assessment/Plan   Diagnoses and all orders for this visit:  Hyperopia, bilateral  Presbyopia  Glasses prescription given to patient today   -Followup in 1 year or as needed for symptoms (RSVP: redness, sensitivity to light, vision loss, pain)

## 2024-04-02 PROBLEM — G56.02 CARPAL TUNNEL SYNDROME, LEFT: Status: ACTIVE | Noted: 2024-03-12

## 2024-04-04 ENCOUNTER — PHARMACY VISIT (OUTPATIENT)
Dept: PHARMACY | Facility: CLINIC | Age: 59
End: 2024-04-04
Payer: COMMERCIAL

## 2024-04-04 ENCOUNTER — TELEMEDICINE (OUTPATIENT)
Dept: PRIMARY CARE | Facility: CLINIC | Age: 59
End: 2024-04-04
Payer: COMMERCIAL

## 2024-04-04 DIAGNOSIS — F51.01 PRIMARY INSOMNIA: ICD-10-CM

## 2024-04-04 DIAGNOSIS — J40 BRONCHITIS: Primary | ICD-10-CM

## 2024-04-04 PROCEDURE — RXMED WILLOW AMBULATORY MEDICATION CHARGE

## 2024-04-04 PROCEDURE — 1036F TOBACCO NON-USER: CPT | Performed by: INTERNAL MEDICINE

## 2024-04-04 PROCEDURE — 99213 OFFICE O/P EST LOW 20 MIN: CPT | Performed by: INTERNAL MEDICINE

## 2024-04-04 RX ORDER — HYDROXYZINE PAMOATE 50 MG/1
50 CAPSULE ORAL NIGHTLY PRN
Qty: 30 CAPSULE | Refills: 0 | Status: SHIPPED | OUTPATIENT
Start: 2024-04-04 | End: 2024-04-22 | Stop reason: SDUPTHER

## 2024-04-04 RX ORDER — GUAIFENESIN 600 MG/1
600 TABLET, EXTENDED RELEASE ORAL 2 TIMES DAILY
Qty: 14 TABLET | Refills: 0 | Status: SHIPPED | OUTPATIENT
Start: 2024-04-04 | End: 2024-04-11

## 2024-04-04 RX ORDER — METHYLPREDNISOLONE 4 MG/1
TABLET ORAL
Qty: 21 TABLET | Refills: 0 | Status: SHIPPED | OUTPATIENT
Start: 2024-04-04 | End: 2024-04-11

## 2024-04-04 RX ORDER — AZITHROMYCIN 250 MG/1
TABLET, FILM COATED ORAL
Qty: 6 TABLET | Refills: 0 | Status: SHIPPED | OUTPATIENT
Start: 2024-04-04 | End: 2024-04-09

## 2024-04-04 RX ORDER — BENZONATATE 100 MG/1
100 CAPSULE ORAL 3 TIMES DAILY PRN
Qty: 21 CAPSULE | Refills: 0 | Status: SHIPPED | OUTPATIENT
Start: 2024-04-04 | End: 2024-04-11

## 2024-04-04 ASSESSMENT — ENCOUNTER SYMPTOMS
CHILLS: 1
FEVER: 1
COUGH: 1
WHEEZING: 0
POLYDIPSIA: 0
PALPITATIONS: 0
CHOKING: 0
FATIGUE: 1
SHORTNESS OF BREATH: 0
CHEST TIGHTNESS: 0

## 2024-04-04 NOTE — PROGRESS NOTES
Subjective   Patient ID: Jessica Jordan is a 58 y.o. female who presents for No chief complaint on file..    58-year-old female presents today for an acute appointment scheduled on the same day for telephone-based virtual visit at the request of the patient and she has consented to a visit in this manner.      She has a 6-day history of upper respiratory symptoms sinus congestion aches chills sweats low-grade fevers and then lower respiratory symptoms of cough that is now progressed into a severe barking cough with sputum production that she describes as yellow.  She has not done a home COVID test and was advised to do so.  She is outside the window for treating with antiviral medications but will be important to know for prognosis and monitoring purposes.  There is no chest pain severe shortness of breath.  The cough does aggravate and affect sleep.          Review of Systems   Constitutional:  Positive for chills, fatigue and fever.   Respiratory:  Positive for cough. Negative for choking, chest tightness, shortness of breath and wheezing.    Cardiovascular:  Negative for chest pain and palpitations.   Endocrine: Negative for polydipsia and polyuria.       Objective   There were no vitals taken for this visit.    Physical Exam  Constitutional:       Appearance: Normal appearance.   HENT:      Head: Normocephalic and atraumatic.   Eyes:      Extraocular Movements: Extraocular movements intact.      Pupils: Pupils are equal, round, and reactive to light.   Neck:      Thyroid: No thyroid mass or thyromegaly.      Vascular: No carotid bruit.   Cardiovascular:      Rate and Rhythm: Normal rate and regular rhythm.      Heart sounds: No murmur heard.     No friction rub. No gallop.   Pulmonary:      Effort: No respiratory distress.      Breath sounds: No wheezing, rhonchi or rales.   Musculoskeletal:      Cervical back: Neck supple.      Right lower leg: No edema.      Left lower leg: No edema.   Lymphadenopathy:       Cervical: No cervical adenopathy.   Neurological:      Mental Status: She is alert.         Assessment/Plan   Problem List Items Addressed This Visit    None  Visit Diagnoses         Codes    Bronchitis    -  Primary J40    Relevant Medications    azithromycin (Zithromax) 250 mg tablet    methylPREDNISolone (Medrol Dospak) 4 mg tablets    benzonatate (Tessalon) 100 mg capsule    guaiFENesin (Mucinex) 600 mg 12 hr tablet

## 2024-04-08 ENCOUNTER — TELEPHONE (OUTPATIENT)
Dept: PRIMARY CARE | Facility: CLINIC | Age: 59
End: 2024-04-08

## 2024-04-08 ENCOUNTER — TELEMEDICINE CLINICAL SUPPORT (OUTPATIENT)
Dept: PREADMISSION TESTING | Facility: HOSPITAL | Age: 59
End: 2024-04-08
Payer: COMMERCIAL

## 2024-04-08 DIAGNOSIS — G56.01 CARPAL TUNNEL SYNDROME, RIGHT: ICD-10-CM

## 2024-04-08 RX ORDER — ACETAMINOPHEN AND PHENYLEPHRINE HCL 325; 5 MG/1; MG/1
1 TABLET ORAL DAILY
COMMUNITY

## 2024-04-08 NOTE — PREPROCEDURE INSTRUCTIONS
Current Medications   Medication Instructions    azithromycin (Zithromax) 250 mg tablet Take as directed as prescriber- complete full prescription as ordered    benzonatate (Tessalon) 100 mg capsule Take morning of surgery with sip of water, no other fluids    biotin 10 mg tablet Stop 7 days before surgery    cholecalciferol, vitamin D3, 125 mcg (5,000 unit) tablet,disintegrating Stop 7 days before surgery    docosahexaenoic acid/epa (FISH OIL ORAL) Stop 7 days before surgery    guaiFENesin (Mucinex) 600 mg 12 hr tablet Take morning of surgery with sip of water, no other fluids    hydrOXYzine pamoate (VistariL) 50 mg capsule Continue until night before surgery    methylPREDNISolone (Medrol Dospak) 4 mg tablets Take as directed by prescriber    sertraline (Zoloft) 50 mg tablet Take morning of surgery with sip of water, no other fluids                       NPO Instructions:    Do not eat any food after midnight the night before your surgery/procedure.    Additional Instructions:     Seven/Six Days before Surgery:  Review your medication instructions, stop indicated medications  Five Days before Surgery:  Review your medication instructions, stop indicated medications  Three Days before Surgery:  Review your medication instructions, stop indicated medications  The Day before Surgery:  Review your medication instructions, stop indicated medications  You will be contacted regarding the time of your arrival to facility and surgery time  Do not eat any food after Midnight  Day of Surgery:  Review your medication instructions, take indicated medications  If you have diabetes, please check your fasting blood sugar upon awakening.  If fasting blood sugar is <80 mg/dl, drink 100 ml of apple juice, time limit of 2 hours before  Wear  comfortable loose fitting clothing  Do not use moisturizers, creams, lotions or perfume  All jewelry and valuables should be left at home    PAT DISCHARGE INSTRUCTIONS    Please call the Same Day  Surgery (SDS) Department of the hospital where your procedure will be performed between 2:00- 3:30 PM the day before your surgery. If you are scheduled on a Monday, or a Tuesday following a Monday holiday, you will need to call on the last business day prior to your surgery.    [facility]  [address] [place] [address]  [phone]    Please let your surgeon know if:      You develop any open sores, shingles, burning or painful urination as these may increase your risk of an infection.   You no longer wish to have the surgery.   Any other personal circumstances change that may lead to the need to cancel or defer this surgery-such as being sick or getting admitted to any hospital within one week of your planned procedure.    Your contact details change, such as a change of address or phone number.    Starting now:     Please DO NOT drink alcohol or smoke for 24 hours before surgery. It is well known that quitting smoking can make a huge difference to your health and recovery from surgery. The longer you abstain from smoking, the better your chances of a healthy recovery. If you need help with quitting, call 1800-QUIT-NOW to be connected to a trained counselor who will discuss the best methods to help you quit.     Before your surgery:    Please stop all supplements 7 days prior to surgery. Or as directed by your surgeon.   Please stop taking NSAID pain medicine such as Advil and Motrin 7 days before surgery.    If you develop any fever, cough, cold, rashes, cuts, scratches, scrapes, urinary symptoms or infection anywhere on your body (including teeth and gums) prior to surgery, please call your surgeon’s office as soon as possible. This may require treatment to reduce the chance of cancellation on the day of surgery.    The day before your surgery:   DIET- Do not eat any food after MIDNIGHT.   Get a good night’s rest.  Use the special soap for bathing if you have been  instructed to use one.    Scheduled surgery times may change and you will be notified if this occurs - please check your personal voicemail for any updates.     On the morning of surgery:   Wear comfortable, loose fitting clothes which open in the front. Please do not wear moisturizers, creams, lotions, makeup or perfume.    Please bring with you to surgery:   Photo ID and insurance card   Current list of medicines and allergies   Pacemaker/ Defibrillator/Heart stent cards   CPAP machine and mask    Slings/ splints/ crutches   A copy of your complete advanced directive/DHPOA.    Please do NOT bring with you to surgery:   All jewelry and valuables should be left at home.   Prosthetic devices such as contact lenses, hearing aids, dentures, eyelash extensions, hairpins and body piercings must be removed prior to going in to the surgical suite.    After outpatient surgery:   A responsible adult MUST accompany you at the time of discharge and stay with you for 24 hours after your surgery. You may NOT drive yourself home after surgery.    Do not drive, operate machinery, make critical decisions or do activities that require co-ordination or balance until after a night’s sleep.   Do not drink alcoholic beverages for 24 hours.   Instructions for resuming your medications will be provided by your surgeon.    CALL YOUR DOCTOR AFTER SURGERY IF YOU HAVE:     Chills and/or a fever of 101° F or higher.    Redness, swelling, pus or drainage from your surgical wound or a bad smell from the wound.    Lightheadedness, fainting or confusion.    Persistent vomiting (throwing up) and are not able to eat or drink for 12 hours.    Three or more loose, watery bowel movements in 24 hours (diarrhea).   Difficulty or pain while urinating( after non-urological surgery)    Pain and swelling in your legs, especially if it is only on one side.    Difficulty breathing or are breathing faster than normal.    Any new concerning  symptoms.      Reviewed pre-op instructions with patient, states understanding and denies further questions at this time.    If you have not received a call regarding your arrival time for surgery by 2pm on the day before surgery, you can call [phone].    Take Care Jessica!

## 2024-04-08 NOTE — TELEPHONE ENCOUNTER
You did the right thing, thank you for inform patient that the best course of action given that I am out of the office and assuming there are no available same-day appointments with another provider.

## 2024-04-08 NOTE — TELEPHONE ENCOUNTER
Patient is having diarrhea,vomiting and respiratory issue. Told patient to go to urgent care she refuse

## 2024-04-08 NOTE — NURSING NOTE
Pt reporting she has been on a z-pack for one week for bronchitis and that she is still having productive coughing, chills, and also has started having abdominal pain, vomiting, and diarrhea. Secure chat sent to Dr. Bennett and patient advised to call his office as well, she was wondering if she should reschedule her surgery.

## 2024-04-22 DIAGNOSIS — F51.01 PRIMARY INSOMNIA: ICD-10-CM

## 2024-04-22 RX ORDER — HYDROXYZINE PAMOATE 50 MG/1
50 CAPSULE ORAL NIGHTLY PRN
Qty: 30 CAPSULE | Refills: 2 | Status: SHIPPED | OUTPATIENT
Start: 2024-04-22 | End: 2024-07-21

## 2024-04-25 ENCOUNTER — PHARMACY VISIT (OUTPATIENT)
Dept: PHARMACY | Facility: CLINIC | Age: 59
End: 2024-04-25
Payer: COMMERCIAL

## 2024-04-25 PROCEDURE — RXMED WILLOW AMBULATORY MEDICATION CHARGE

## 2024-05-27 PROCEDURE — RXMED WILLOW AMBULATORY MEDICATION CHARGE

## 2024-05-28 ENCOUNTER — PHARMACY VISIT (OUTPATIENT)
Dept: PHARMACY | Facility: CLINIC | Age: 59
End: 2024-05-28
Payer: COMMERCIAL

## 2024-06-27 ENCOUNTER — LAB (OUTPATIENT)
Dept: LAB | Facility: LAB | Age: 59
End: 2024-06-27
Payer: COMMERCIAL

## 2024-06-27 DIAGNOSIS — E55.9 VITAMIN D INSUFFICIENCY: ICD-10-CM

## 2024-06-27 DIAGNOSIS — E78.5 DYSLIPIDEMIA: ICD-10-CM

## 2024-06-27 DIAGNOSIS — Z51.81 MEDICATION MONITORING ENCOUNTER: ICD-10-CM

## 2024-06-27 LAB
25(OH)D3 SERPL-MCNC: 50 NG/ML (ref 30–100)
ALBUMIN SERPL BCP-MCNC: 4.7 G/DL (ref 3.4–5)
ALP SERPL-CCNC: 72 U/L (ref 33–110)
ALT SERPL W P-5'-P-CCNC: 22 U/L (ref 7–45)
ANION GAP SERPL CALC-SCNC: 19 MMOL/L (ref 10–20)
AST SERPL W P-5'-P-CCNC: 24 U/L (ref 9–39)
BILIRUB SERPL-MCNC: 0.5 MG/DL (ref 0–1.2)
BUN SERPL-MCNC: 15 MG/DL (ref 6–23)
CALCIUM SERPL-MCNC: 10.2 MG/DL (ref 8.6–10.6)
CHLORIDE SERPL-SCNC: 100 MMOL/L (ref 98–107)
CHOLEST SERPL-MCNC: 244 MG/DL (ref 0–199)
CHOLESTEROL/HDL RATIO: 4.3
CO2 SERPL-SCNC: 29 MMOL/L (ref 21–32)
CREAT SERPL-MCNC: 1.05 MG/DL (ref 0.5–1.05)
EGFRCR SERPLBLD CKD-EPI 2021: 61 ML/MIN/1.73M*2
ERYTHROCYTE [DISTWIDTH] IN BLOOD BY AUTOMATED COUNT: 13.7 % (ref 11.5–14.5)
EST. AVERAGE GLUCOSE BLD GHB EST-MCNC: 97 MG/DL
GLUCOSE SERPL-MCNC: 99 MG/DL (ref 74–99)
HBA1C MFR BLD: 5 %
HCT VFR BLD AUTO: 45.3 % (ref 36–46)
HDLC SERPL-MCNC: 57.2 MG/DL
HGB BLD-MCNC: 14.6 G/DL (ref 12–16)
LDLC SERPL CALC-MCNC: 136 MG/DL
MCH RBC QN AUTO: 28.8 PG (ref 26–34)
MCHC RBC AUTO-ENTMCNC: 32.2 G/DL (ref 32–36)
MCV RBC AUTO: 89 FL (ref 80–100)
NON HDL CHOLESTEROL: 187 MG/DL (ref 0–149)
NRBC BLD-RTO: 0 /100 WBCS (ref 0–0)
PLATELET # BLD AUTO: 232 X10*3/UL (ref 150–450)
POTASSIUM SERPL-SCNC: 4.7 MMOL/L (ref 3.5–5.3)
PROT SERPL-MCNC: 7.9 G/DL (ref 6.4–8.2)
RBC # BLD AUTO: 5.07 X10*6/UL (ref 4–5.2)
SODIUM SERPL-SCNC: 143 MMOL/L (ref 136–145)
TRIGL SERPL-MCNC: 252 MG/DL (ref 0–149)
VLDL: 50 MG/DL (ref 0–40)
WBC # BLD AUTO: 6.7 X10*3/UL (ref 4.4–11.3)

## 2024-06-27 PROCEDURE — 83036 HEMOGLOBIN GLYCOSYLATED A1C: CPT

## 2024-06-27 PROCEDURE — RXMED WILLOW AMBULATORY MEDICATION CHARGE

## 2024-06-27 PROCEDURE — 85027 COMPLETE CBC AUTOMATED: CPT

## 2024-06-27 PROCEDURE — 80061 LIPID PANEL: CPT

## 2024-06-27 PROCEDURE — 80053 COMPREHEN METABOLIC PANEL: CPT

## 2024-06-27 PROCEDURE — 36415 COLL VENOUS BLD VENIPUNCTURE: CPT

## 2024-06-27 PROCEDURE — 82306 VITAMIN D 25 HYDROXY: CPT

## 2024-07-01 ENCOUNTER — PHARMACY VISIT (OUTPATIENT)
Dept: PHARMACY | Facility: CLINIC | Age: 59
End: 2024-07-01
Payer: COMMERCIAL

## 2024-07-02 ENCOUNTER — APPOINTMENT (OUTPATIENT)
Dept: PRIMARY CARE | Facility: CLINIC | Age: 59
End: 2024-07-02
Payer: COMMERCIAL

## 2024-07-02 VITALS
DIASTOLIC BLOOD PRESSURE: 82 MMHG | BODY MASS INDEX: 24.75 KG/M2 | SYSTOLIC BLOOD PRESSURE: 130 MMHG | WEIGHT: 131 LBS | TEMPERATURE: 97.6 F | HEART RATE: 78 BPM

## 2024-07-02 DIAGNOSIS — F32.A DEPRESSION, UNSPECIFIED DEPRESSION TYPE: ICD-10-CM

## 2024-07-02 DIAGNOSIS — Z12.31 SCREENING MAMMOGRAM FOR BREAST CANCER: ICD-10-CM

## 2024-07-02 DIAGNOSIS — M85.89 OSTEOPENIA OF MULTIPLE SITES: Primary | ICD-10-CM

## 2024-07-02 DIAGNOSIS — F51.01 PRIMARY INSOMNIA: ICD-10-CM

## 2024-07-02 PROCEDURE — 1036F TOBACCO NON-USER: CPT | Performed by: INTERNAL MEDICINE

## 2024-07-02 PROCEDURE — RXMED WILLOW AMBULATORY MEDICATION CHARGE

## 2024-07-02 PROCEDURE — 99396 PREV VISIT EST AGE 40-64: CPT | Performed by: INTERNAL MEDICINE

## 2024-07-02 RX ORDER — SERTRALINE HYDROCHLORIDE 50 MG/1
75 TABLET, FILM COATED ORAL DAILY
Qty: 135 TABLET | Refills: 3 | Status: SHIPPED | OUTPATIENT
Start: 2024-07-02 | End: 2025-07-02

## 2024-07-02 RX ORDER — HYDROXYZINE PAMOATE 50 MG/1
100 CAPSULE ORAL NIGHTLY PRN
Qty: 180 CAPSULE | Refills: 2 | Status: SHIPPED | OUTPATIENT
Start: 2024-07-02 | End: 2025-03-29

## 2024-07-02 ASSESSMENT — ENCOUNTER SYMPTOMS
PALPITATIONS: 0
CHILLS: 0
COUGH: 0
FEVER: 0
SHORTNESS OF BREATH: 0
POLYDIPSIA: 0

## 2024-07-02 NOTE — PROGRESS NOTES
Subjective   Patient ID: Jessica Jordan is a 58 y.o. female who presents for No chief complaint on file..    Patient presents today for an annual wellness exam    Medical history and surgical history updated today  Medication list reconciled and updated  Patient denies vision issues at this time: eye exam just completed  Patient denies hearing issues at this time  Follows with a dentist: Yes    Patient counseled about available preventative health vaccinations and provided with opportunity to update them with our office or through prescription to preferred pharmacy. RSV in a month and Shingles vaccine.     Reviewed and discussed preventative health screening recommendations for colon cancer: 2027    Reviewed and discussed preventative health recommendations for screening for cervical cancer and breast cancer: ordered.     DEXA discussed and ordered- prior osteopenia.          Review of Systems   Constitutional:  Negative for chills and fever.   Respiratory:  Negative for cough and shortness of breath.    Cardiovascular:  Negative for chest pain and palpitations.   Endocrine: Negative for polydipsia and polyuria.       Objective   /82   Pulse 78   Temp 36.4 °C (97.6 °F)   Wt 59.4 kg (131 lb)   BMI 24.75 kg/m²     Physical Exam  Constitutional:       Appearance: Normal appearance.   HENT:      Head: Normocephalic and atraumatic.   Eyes:      Extraocular Movements: Extraocular movements intact.      Pupils: Pupils are equal, round, and reactive to light.   Neck:      Thyroid: No thyroid mass or thyromegaly.      Vascular: No carotid bruit.   Cardiovascular:      Rate and Rhythm: Normal rate and regular rhythm.      Heart sounds: No murmur heard.     No friction rub. No gallop.   Pulmonary:      Effort: No respiratory distress.      Breath sounds: No wheezing, rhonchi or rales.   Musculoskeletal:      Cervical back: Neck supple.      Right lower leg: No edema.      Left lower leg: No edema.   Lymphadenopathy:       Cervical: No cervical adenopathy.   Neurological:      Mental Status: She is alert.         Assessment/Plan   Problem List Items Addressed This Visit             ICD-10-CM    Primary insomnia F51.01    Relevant Medications    hydrOXYzine pamoate (VistariL) 50 mg capsule    Depression F32.A    Relevant Medications    sertraline (Zoloft) 50 mg tablet     Other Visit Diagnoses         Codes    Osteopenia of multiple sites    -  Primary M85.89    Relevant Orders    XR DEXA bone density    Screening mammogram for breast cancer     Z12.31    Relevant Orders    BI mammo bilateral screening tomosynthesis

## 2024-07-15 ENCOUNTER — HOSPITAL ENCOUNTER (OUTPATIENT)
Dept: RADIOLOGY | Facility: HOSPITAL | Age: 59
Discharge: HOME | End: 2024-07-15
Payer: COMMERCIAL

## 2024-07-15 ENCOUNTER — PHARMACY VISIT (OUTPATIENT)
Dept: PHARMACY | Facility: CLINIC | Age: 59
End: 2024-07-15
Payer: COMMERCIAL

## 2024-07-15 VITALS — WEIGHT: 130.95 LBS | HEIGHT: 61 IN | BODY MASS INDEX: 24.72 KG/M2

## 2024-07-15 DIAGNOSIS — Z12.31 SCREENING MAMMOGRAM FOR BREAST CANCER: ICD-10-CM

## 2024-07-15 PROCEDURE — 77067 SCR MAMMO BI INCL CAD: CPT | Performed by: RADIOLOGY

## 2024-07-15 PROCEDURE — 77063 BREAST TOMOSYNTHESIS BI: CPT | Performed by: RADIOLOGY

## 2024-07-15 PROCEDURE — 77067 SCR MAMMO BI INCL CAD: CPT

## 2024-07-18 PROCEDURE — RXMED WILLOW AMBULATORY MEDICATION CHARGE

## 2024-07-19 ENCOUNTER — PHARMACY VISIT (OUTPATIENT)
Dept: PHARMACY | Facility: CLINIC | Age: 59
End: 2024-07-19
Payer: COMMERCIAL
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
## 2024-08-22 ENCOUNTER — APPOINTMENT (OUTPATIENT)
Dept: OBSTETRICS AND GYNECOLOGY | Facility: CLINIC | Age: 59
End: 2024-08-22
Payer: COMMERCIAL

## 2024-08-22 VITALS
BODY MASS INDEX: 25.22 KG/M2 | WEIGHT: 133.6 LBS | HEIGHT: 61 IN | SYSTOLIC BLOOD PRESSURE: 120 MMHG | DIASTOLIC BLOOD PRESSURE: 72 MMHG

## 2024-08-22 DIAGNOSIS — Z01.419 WELL WOMAN EXAM WITH ROUTINE GYNECOLOGICAL EXAM: Primary | ICD-10-CM

## 2024-08-22 DIAGNOSIS — Z12.4 CERVICAL CANCER SCREENING: ICD-10-CM

## 2024-08-22 PROCEDURE — 3008F BODY MASS INDEX DOCD: CPT | Performed by: OBSTETRICS & GYNECOLOGY

## 2024-08-22 PROCEDURE — 1036F TOBACCO NON-USER: CPT | Performed by: OBSTETRICS & GYNECOLOGY

## 2024-08-22 PROCEDURE — 87624 HPV HI-RISK TYP POOLED RSLT: CPT

## 2024-08-22 PROCEDURE — 88175 CYTOPATH C/V AUTO FLUID REDO: CPT

## 2024-08-22 PROCEDURE — 99396 PREV VISIT EST AGE 40-64: CPT | Performed by: OBSTETRICS & GYNECOLOGY

## 2024-08-22 ASSESSMENT — ENCOUNTER SYMPTOMS
GASTROINTESTINAL NEGATIVE: 0
NEUROLOGICAL NEGATIVE: 0
CARDIOVASCULAR NEGATIVE: 0
RESPIRATORY NEGATIVE: 0
CONSTITUTIONAL NEGATIVE: 0
MUSCULOSKELETAL NEGATIVE: 0
ALLERGIC/IMMUNOLOGIC NEGATIVE: 0
EYES NEGATIVE: 0
ENDOCRINE NEGATIVE: 0
HEMATOLOGIC/LYMPHATIC NEGATIVE: 0
PSYCHIATRIC NEGATIVE: 0

## 2024-08-22 ASSESSMENT — PAIN SCALES - GENERAL: PAINLEVEL: 0-NO PAIN

## 2024-08-22 ASSESSMENT — PATIENT HEALTH QUESTIONNAIRE - PHQ9
SUM OF ALL RESPONSES TO PHQ9 QUESTIONS 1 & 2: 0
1. LITTLE INTEREST OR PLEASURE IN DOING THINGS: NOT AT ALL
2. FEELING DOWN, DEPRESSED OR HOPELESS: NOT AT ALL

## 2024-09-09 ENCOUNTER — HOSPITAL ENCOUNTER (OUTPATIENT)
Dept: RADIOLOGY | Facility: CLINIC | Age: 59
Discharge: HOME | End: 2024-09-09
Payer: COMMERCIAL

## 2024-09-09 DIAGNOSIS — M85.89 OSTEOPENIA OF MULTIPLE SITES: ICD-10-CM

## 2024-09-09 PROCEDURE — 77080 DXA BONE DENSITY AXIAL: CPT

## 2024-09-24 ENCOUNTER — OFFICE VISIT (OUTPATIENT)
Dept: URGENT CARE | Age: 59
End: 2024-09-24
Payer: COMMERCIAL

## 2024-09-24 ENCOUNTER — PHARMACY VISIT (OUTPATIENT)
Dept: PHARMACY | Facility: CLINIC | Age: 59
End: 2024-09-24
Payer: COMMERCIAL

## 2024-09-24 VITALS
OXYGEN SATURATION: 97 % | HEART RATE: 71 BPM | RESPIRATION RATE: 15 BRPM | DIASTOLIC BLOOD PRESSURE: 82 MMHG | WEIGHT: 133 LBS | TEMPERATURE: 98.6 F | BODY MASS INDEX: 25.13 KG/M2 | SYSTOLIC BLOOD PRESSURE: 117 MMHG

## 2024-09-24 DIAGNOSIS — B34.9: Primary | ICD-10-CM

## 2024-09-24 PROCEDURE — RXMED WILLOW AMBULATORY MEDICATION CHARGE

## 2024-09-24 RX ORDER — ALBUTEROL SULFATE 90 UG/1
2 INHALANT RESPIRATORY (INHALATION) EVERY 4 HOURS PRN
Qty: 6.7 G | Refills: 0 | Status: SHIPPED | OUTPATIENT
Start: 2024-09-24 | End: 2025-09-24

## 2024-09-24 ASSESSMENT — ENCOUNTER SYMPTOMS: COUGH: 1

## 2024-09-24 NOTE — PROGRESS NOTES
Subjective   Patient ID: Jessica Jordan is a 59 y.o. female. They present today with a chief complaint of Cough.    History of Present Illness  Reports predominately dry cough, chest congestion, subjective fever x4-5 days. Endorses mild SOB upon activity x3 days. Had 5+ episodes of diarrhea yesterday which has resolved. Tested negative for COVID 2 days ago. Denies abdominal or back pain, N/V, known exposures, leg swelling, wheezing. Has been taking Dayquil/Nyquil with some relief.       Cough        Past Medical History  Allergies as of 2024 - Reviewed 2024   Allergen Reaction Noted    Penicillins Unknown and Anaphylaxis 2023    Levofloxacin Anxiety 2023       (Not in a hospital admission)       Past Medical History:   Diagnosis Date    Allergic     Anxiety     Depression     Hyperlipidemia     Hypertension 2023    Varicella     Visual impairment        Past Surgical History:   Procedure Laterality Date     SECTION, CLASSIC  2014     Section    CT ANGIO CORONARY ART WITH HEARTFLOW IF SCORE >30%  2020    CT HEART CORONARY ANGIOGRAM 2020 [facility] ANCILLARY LEGACY    WISDOM TOOTH EXTRACTION          reports that she quit smoking about 19 years ago. Her smoking use included cigarettes. She started smoking about 34 years ago. She has a 15 pack-year smoking history. She has never been exposed to tobacco smoke. She has never used smokeless tobacco. She reports current alcohol use of about 2.0 standard drinks of alcohol per week. She reports that she does not use drugs.    Review of Systems  Pertinent systems reviewed and were negative unless otherwise stated in HPI.    Objective    Vitals:    24 0905   BP: 117/82   Pulse: 71   Resp: 15   Temp: 37 °C (98.6 °F)   SpO2: 97%   Weight: 60.3 kg (133 lb)     No LMP recorded. Patient is postmenopausal.    Physical Exam  Constitutional:       General: She is not in acute distress.  HENT:      Right Ear: Tympanic  membrane, ear canal and external ear normal.      Left Ear: Tympanic membrane, ear canal and external ear normal.      Nose: No congestion.      Mouth/Throat:      Mouth: Mucous membranes are moist.      Pharynx: No oropharyngeal exudate or posterior oropharyngeal erythema.   Eyes:      Conjunctiva/sclera: Conjunctivae normal.   Cardiovascular:      Rate and Rhythm: Normal rate and regular rhythm.      Heart sounds: Normal heart sounds.   Pulmonary:      Effort: Pulmonary effort is normal. No respiratory distress.      Breath sounds: No wheezing or rhonchi.   Musculoskeletal:      Cervical back: Normal range of motion.   Skin:     Findings: No rash.         Diagnostic study results (if any) were reviewed by Sathya Messina PA-C.    Assessment/Plan   Allergies, medications, history, and pertinent labs/EKGs/imaging reviewed by Sathya Messina PA-C.     Medical Decision Making  Low concern for pneumonia, AOM, strep, bronchitis, heart failure, PE. Prednisone likely would not benefit in absence of wheezing. Advised Mucinex DM. Offered benzonatate, however patient states she has some at home. CXR not indicated without abnormal vitals, focal auscultory findings, no respiratory distress on exam.    Orders and Diagnoses  Diagnoses and all orders for this visit:  Viral infectious disease      Medical Admin Record      Disposition: Home    Electronically signed by Sathya Messina PA-C

## 2024-09-25 ENCOUNTER — PHARMACY VISIT (OUTPATIENT)
Dept: PHARMACY | Facility: CLINIC | Age: 59
End: 2024-09-25
Payer: COMMERCIAL

## 2024-09-30 ENCOUNTER — OFFICE VISIT (OUTPATIENT)
Dept: PRIMARY CARE | Facility: HOSPITAL | Age: 59
End: 2024-09-30
Payer: COMMERCIAL

## 2024-09-30 ENCOUNTER — DOCUMENTATION (OUTPATIENT)
Dept: PRIMARY CARE | Facility: HOSPITAL | Age: 59
End: 2024-09-30
Payer: COMMERCIAL

## 2024-09-30 ENCOUNTER — PHARMACY VISIT (OUTPATIENT)
Dept: PHARMACY | Facility: CLINIC | Age: 59
End: 2024-09-30
Payer: COMMERCIAL

## 2024-09-30 VITALS
TEMPERATURE: 97.8 F | SYSTOLIC BLOOD PRESSURE: 142 MMHG | OXYGEN SATURATION: 99 % | HEART RATE: 73 BPM | DIASTOLIC BLOOD PRESSURE: 89 MMHG

## 2024-09-30 DIAGNOSIS — R05.2 SUBACUTE COUGH: Primary | ICD-10-CM

## 2024-09-30 PROCEDURE — 99203 OFFICE O/P NEW LOW 30 MIN: CPT

## 2024-09-30 PROCEDURE — 1036F TOBACCO NON-USER: CPT

## 2024-09-30 PROCEDURE — 99213 OFFICE O/P EST LOW 20 MIN: CPT | Mod: GC

## 2024-09-30 PROCEDURE — RXMED WILLOW AMBULATORY MEDICATION CHARGE

## 2024-09-30 RX ORDER — PREDNISONE 20 MG/1
40 TABLET ORAL DAILY
Qty: 10 TABLET | Refills: 0 | Status: SHIPPED | OUTPATIENT
Start: 2024-09-30 | End: 2024-10-05

## 2024-09-30 ASSESSMENT — PATIENT HEALTH QUESTIONNAIRE - PHQ9
2. FEELING DOWN, DEPRESSED OR HOPELESS: NOT AT ALL
SUM OF ALL RESPONSES TO PHQ9 QUESTIONS 1 AND 2: 0
1. LITTLE INTEREST OR PLEASURE IN DOING THINGS: NOT AT ALL

## 2024-09-30 ASSESSMENT — COLUMBIA-SUICIDE SEVERITY RATING SCALE - C-SSRS
6. HAVE YOU EVER DONE ANYTHING, STARTED TO DO ANYTHING, OR PREPARED TO DO ANYTHING TO END YOUR LIFE?: NO
1. IN THE PAST MONTH, HAVE YOU WISHED YOU WERE DEAD OR WISHED YOU COULD GO TO SLEEP AND NOT WAKE UP?: NO
2. HAVE YOU ACTUALLY HAD ANY THOUGHTS OF KILLING YOURSELF?: NO

## 2024-09-30 ASSESSMENT — ENCOUNTER SYMPTOMS
LOSS OF SENSATION IN FEET: 0
DEPRESSION: 0
OCCASIONAL FEELINGS OF UNSTEADINESS: 0

## 2024-09-30 NOTE — PROGRESS NOTES
HISTORY OF PRESENT ILLNESS:   Jessica Jordan is a 59 y.o. F who presents with a subacute cough. She has an established pcp, pt is here for sick visit.     She states that she has had a dry cough for the past 10d. She was taking dayquil/nyquil with no improvement. She went to urgent care who gave her mucinex, tessalon perles, and albuterol for some shortness of breath during coughing episodes. None of these interventions have helped her cough. She has been around her daughter and her mother - her daughter had a respiratory illness recently and her mom was diagnosed with covid.     Pt otherwise has No sore throat, fevers, runny nose, no blood, n/v.   She does have sweats and mild sob.     Allergic to PCN     Drug use: prior cigarettes, quit 19 years ago         ROS: 12 point ROS negative unless as per HPI     Patient Active Problem List    Diagnosis Date Noted    Well woman exam with routine gynecological exam 08/22/2024    Cervical cancer screening 08/22/2024    Bilateral carpal tunnel syndrome 02/10/2024    Physical exam 10/11/2023    Depression 10/11/2023    Acne 04/11/2023    Anxiety disorder 04/11/2023    Contact dermatitis and eczema 04/11/2023    Dyslipidemia 04/11/2023    Hyperopia with presbyopia 04/11/2023    Idiopathic insomnia 04/11/2023    Instability of right knee joint 04/11/2023    Memory dysfunction 04/11/2023    Vitamin D insufficiency 04/11/2023    Postmenopausal bleeding 04/11/2023    Right knee pain 04/11/2023    Right rotator cuff tendinitis 04/11/2023    Urine frequency 04/11/2023    Vaginal atrophy 04/11/2023    Vaginal yeast infection 04/11/2023    Primary insomnia 04/11/2023    Carpal tunnel syndrome, right 03/12/2024    Carpal tunnel syndrome, left 03/12/2024        Current Outpatient Medications:     albuterol (Proventil HFA) 90 mcg/actuation inhaler, Inhale 2 puffs every 4 hours if needed for wheezing or shortness of breath., Disp: 6.7 g, Rfl: 0    biotin 10 mg tablet, Take 1 tablet by  mouth once daily., Disp: , Rfl:     cholecalciferol, vitamin D3, 125 mcg (5,000 unit) tablet,disintegrating, Take by mouth., Disp: , Rfl:     clindamycin-benzoyl peroxide 1-5 % gel with pump, once daily as needed., Disp: , Rfl:     docosahexaenoic acid/epa (FISH OIL ORAL), Take 1 tablet by mouth once daily., Disp: , Rfl:     hydrOXYzine pamoate (VistariL) 50 mg capsule, Take 2 capsules (100 mg) by mouth as needed at bedtime (insomnia)., Disp: 180 capsule, Rfl: 2    sertraline (Zoloft) 50 mg tablet, TAKE 1 & 1/2 TABLETS BY MOUTH ONCE DAILY, Disp: 135 tablet, Rfl: 3    No results found for this or any previous visit (from the past 96 hour(s)).       PHYSICAL EXAM:   General: pt is pleasant, cooperative, in NAD  Heart: RRR, no murmur, rub or Roberta  Lungs: mildly decreased breath sounds in bilateral lower lobes   Abdomen: soft, nontender, nondistended, no apparent mass  Extremities: no edema bilaterally  Psychiatric: mental status and behavior appropriate for situation   Neurologic: Aox4       Musculoskeletal: moving all extremities appropriately.    Assessment and plan:     #viral bronchitis   -pt w cough for 10 days   -dry cough, no sputum production, no fevers  -has tried mucinex, dayquil/nyquil, tessalon perles, albuterol with no relief   -not taking ACE inhibitors   Plan:   -prednisone 40mg x 5 days     RTC as needed      Danielle Bejarano MD   IM PGY-3

## 2024-09-30 NOTE — PATIENT INSTRUCTIONS
Dear Ms. Jordan,     You were seen today for a cough. We have given you a course of prednisone (40mg for 5 days). Please make sure to pick this up from your pharmacy. If the cough worsens or does not improve, please either call us back or visit your pcp.     Take care,   Davin [facility]

## 2024-09-30 NOTE — PROGRESS NOTES
Advanced care planning discussed at this visit.  Patient does not currently have a Healthcare Power of  or Living Will in place. She does express that her  Ta Vines has her permission to act as her surrogate decision maker in the event of an emergency. In addition, the patient added her daughter, Isela Vines, as her first alternate [place] surrogate decision maker. Patient advised to bring in POA, Living Will and Advanced Care Plan for her chart if she obtains one.  Patient declined information and documentation on POA and Living Will at this time.

## 2024-10-02 NOTE — PROGRESS NOTES
I reviewed the resident/fellow's documentation and discussed the patient with the resident/fellow. I agree with the resident/fellow's medical decision making as documented in the note.     Marlene Estevez MD MPH

## 2024-10-06 PROCEDURE — RXMED WILLOW AMBULATORY MEDICATION CHARGE

## 2024-10-07 ENCOUNTER — APPOINTMENT (OUTPATIENT)
Dept: PRIMARY CARE | Facility: CLINIC | Age: 59
End: 2024-10-07
Payer: COMMERCIAL

## 2024-10-07 ENCOUNTER — PHARMACY VISIT (OUTPATIENT)
Dept: PHARMACY | Facility: CLINIC | Age: 59
End: 2024-10-07
Payer: COMMERCIAL

## 2024-10-31 ENCOUNTER — PHARMACY VISIT (OUTPATIENT)
Dept: PHARMACY | Facility: CLINIC | Age: 59
End: 2024-10-31
Payer: COMMERCIAL

## 2024-10-31 ENCOUNTER — OFFICE VISIT (OUTPATIENT)
Dept: URGENT CARE | Age: 59
End: 2024-10-31
Payer: COMMERCIAL

## 2024-10-31 VITALS
DIASTOLIC BLOOD PRESSURE: 98 MMHG | SYSTOLIC BLOOD PRESSURE: 145 MMHG | HEART RATE: 83 BPM | WEIGHT: 135 LBS | BODY MASS INDEX: 25.49 KG/M2 | OXYGEN SATURATION: 96 % | HEIGHT: 61 IN | TEMPERATURE: 98.2 F

## 2024-10-31 DIAGNOSIS — S39.012A STRAIN OF LUMBAR REGION, INITIAL ENCOUNTER: ICD-10-CM

## 2024-10-31 DIAGNOSIS — S06.0X0A CONCUSSION WITHOUT LOSS OF CONSCIOUSNESS, INITIAL ENCOUNTER: Primary | ICD-10-CM

## 2024-10-31 PROCEDURE — RXMED WILLOW AMBULATORY MEDICATION CHARGE

## 2024-10-31 PROCEDURE — 99214 OFFICE O/P EST MOD 30 MIN: CPT | Performed by: PHYSICIAN ASSISTANT

## 2024-10-31 PROCEDURE — 3008F BODY MASS INDEX DOCD: CPT | Performed by: PHYSICIAN ASSISTANT

## 2024-10-31 RX ORDER — CYCLOBENZAPRINE HCL 10 MG
5 TABLET ORAL 3 TIMES DAILY
Qty: 15 TABLET | Refills: 0 | Status: SHIPPED | OUTPATIENT
Start: 2024-10-31 | End: 2024-11-10

## 2024-11-01 ENCOUNTER — HOSPITAL ENCOUNTER (EMERGENCY)
Facility: HOSPITAL | Age: 59
Discharge: HOME | End: 2024-11-01
Payer: MEDICARE

## 2024-11-01 ENCOUNTER — APPOINTMENT (OUTPATIENT)
Dept: RADIOLOGY | Facility: HOSPITAL | Age: 59
End: 2024-11-01
Payer: MEDICARE

## 2024-11-01 VITALS
WEIGHT: 134 LBS | BODY MASS INDEX: 25.3 KG/M2 | HEART RATE: 74 BPM | SYSTOLIC BLOOD PRESSURE: 139 MMHG | DIASTOLIC BLOOD PRESSURE: 94 MMHG | TEMPERATURE: 97.7 F | RESPIRATION RATE: 16 BRPM | HEIGHT: 61 IN | OXYGEN SATURATION: 97 %
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
## 2024-11-01 DIAGNOSIS — S06.0X0A CONCUSSION WITHOUT LOSS OF CONSCIOUSNESS, INITIAL ENCOUNTER: ICD-10-CM

## 2024-11-01 DIAGNOSIS — R51.9 ACUTE NONINTRACTABLE HEADACHE, UNSPECIFIED HEADACHE TYPE: Primary | ICD-10-CM

## 2024-11-01 PROCEDURE — 99284 EMERGENCY DEPT VISIT MOD MDM: CPT | Mod: 25

## 2024-11-01 PROCEDURE — RXMED WILLOW AMBULATORY MEDICATION CHARGE

## 2024-11-01 PROCEDURE — 70450 CT HEAD/BRAIN W/O DYE: CPT | Performed by: RADIOLOGY

## 2024-11-01 PROCEDURE — 70450 CT HEAD/BRAIN W/O DYE: CPT

## 2024-11-01 PROCEDURE — 2500000005 HC RX 250 GENERAL PHARMACY W/O HCPCS: Performed by: PHYSICIAN ASSISTANT

## 2024-11-01 PROCEDURE — 2500000001 HC RX 250 WO HCPCS SELF ADMINISTERED DRUGS (ALT 637 FOR MEDICARE OP): Performed by: PHYSICIAN ASSISTANT

## 2024-11-01 RX ORDER — ONDANSETRON 4 MG/1
4 TABLET, ORALLY DISINTEGRATING ORAL EVERY 8 HOURS PRN
Qty: 20 TABLET | Refills: 0 | Status: SHIPPED | OUTPATIENT
Start: 2024-11-01

## 2024-11-01 RX ORDER — CYCLOBENZAPRINE HCL 5 MG
5 TABLET ORAL ONCE
Status: COMPLETED | OUTPATIENT
Start: 2024-11-01 | End: 2024-11-01

## 2024-11-01 RX ORDER — ONDANSETRON 4 MG/1
4 TABLET, ORALLY DISINTEGRATING ORAL ONCE
Status: COMPLETED | OUTPATIENT
Start: 2024-11-01 | End: 2024-11-01

## 2024-11-01 ASSESSMENT — PAIN - FUNCTIONAL ASSESSMENT: PAIN_FUNCTIONAL_ASSESSMENT: 0-10

## 2024-11-01 ASSESSMENT — COLUMBIA-SUICIDE SEVERITY RATING SCALE - C-SSRS
1. IN THE PAST MONTH, HAVE YOU WISHED YOU WERE DEAD OR WISHED YOU COULD GO TO SLEEP AND NOT WAKE UP?: NO
2. HAVE YOU ACTUALLY HAD ANY THOUGHTS OF KILLING YOURSELF?: NO
6. HAVE YOU EVER DONE ANYTHING, STARTED TO DO ANYTHING, OR PREPARED TO DO ANYTHING TO END YOUR LIFE?: NO

## 2024-11-01 ASSESSMENT — PAIN SCALES - GENERAL: PAINLEVEL_OUTOF10: 8

## 2024-11-02 ENCOUNTER — PHARMACY VISIT (OUTPATIENT)
Dept: PHARMACY | Facility: CLINIC | Age: 59
End: 2024-11-02
Payer: COMMERCIAL

## 2024-11-08 ENCOUNTER — APPOINTMENT (OUTPATIENT)
Dept: PRIMARY CARE | Facility: CLINIC | Age: 59
End: 2024-11-08
Payer: COMMERCIAL

## 2024-11-14 ENCOUNTER — OFFICE VISIT (OUTPATIENT)
Dept: ORTHOPEDIC SURGERY | Facility: CLINIC | Age: 59
End: 2024-11-14
Payer: COMMERCIAL

## 2024-11-14 VITALS — HEIGHT: 61 IN | BODY MASS INDEX: 25.89 KG/M2 | WEIGHT: 137.13 LBS

## 2024-11-14 DIAGNOSIS — S06.0X0A CONCUSSION WITHOUT LOSS OF CONSCIOUSNESS, INITIAL ENCOUNTER: Primary | ICD-10-CM

## 2024-11-14 PROCEDURE — 99214 OFFICE O/P EST MOD 30 MIN: CPT | Performed by: PEDIATRICS

## 2024-11-14 ASSESSMENT — PAIN SCALES - GENERAL: PAINLEVEL_OUTOF10: 4

## 2024-11-14 NOTE — LETTER
November 14, 2024     Patient: Jessica Jordan   YOB: 1965   Date of Visit: 11/14/2024     To whom it may concern:    Jessica Jordan is a 59 y.o.  year old who is under my care and recently was evaluated 11/14/2024 for evaluation for concussion.  Symptoms of concussion are improving but can be exacerbated by cognitive functioning, and electronic use.  I have recommended the following accommodations during recovery:      1. Tylenol and ibuprofen medication may be taken for pain relief.  2. Work participation:   - Limited days with use of breaks recommended.    - Planned downtime between responsibilities.    - Limited screen time is much as possible.    - Listen to meetings audibly, without use of screens recommended. Allow speech to text for work.   - Extend deadlines. Allow breaks as needed. Maintain a quiet place.   3. Electronic restrictions: Limited computer, tablet use.  4. Sunglasses and a hat can be used for light sensitivity. Blue light lens permitted when using electronics.  5. Earplugs are recommended for noise sensitivity.  6. Consider use of FMLA/short term disability until symptoms resolve.    Please maintain a copy.      Please reach out should you have any questions or concerns.    Thank you.     Tyra Lawton, DO
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
## 2024-11-27 ENCOUNTER — OFFICE VISIT (OUTPATIENT)
Dept: SPORTS MEDICINE | Facility: HOSPITAL | Age: 59
End: 2024-11-27
Payer: COMMERCIAL

## 2024-11-27 ENCOUNTER — APPOINTMENT (OUTPATIENT)
Dept: PRIMARY CARE | Facility: CLINIC | Age: 59
End: 2024-11-27
Payer: COMMERCIAL

## 2024-11-27 VITALS — DIASTOLIC BLOOD PRESSURE: 96 MMHG | SYSTOLIC BLOOD PRESSURE: 125 MMHG | HEART RATE: 100 BPM

## 2024-11-27 DIAGNOSIS — S06.0X0A CONCUSSION WITHOUT LOSS OF CONSCIOUSNESS, INITIAL ENCOUNTER: Primary | ICD-10-CM

## 2024-11-27 PROCEDURE — 99214 OFFICE O/P EST MOD 30 MIN: CPT | Performed by: PEDIATRICS

## 2024-11-27 PROCEDURE — 1036F TOBACCO NON-USER: CPT | Performed by: PEDIATRICS

## 2024-11-27 NOTE — PATIENT INSTRUCTIONS
The patient has been referred for advanced imaging through [facility]. Please call [phone] and set up an appointment to have this study completed. We recommend booking at a NON-HOSPITAL location. You will need to allow time for the pre-authorization process. We recommend you call back 1 day prior to your appointment to confirm that your insurance company approved the study. Do not having imaging completed until it is approved.     We request that you call our main office at [phone] once your imaging study has been completed. HOLD ON THE LINE TO TALK DIRECTLY TO OUR OFFICE STAFF. DO NOT PRESS 1 TO SCHEDULE. You may set up an in person appointment or a telehealth appointment to review the imaging results. Please leave us a good call back number. Make sure your voicemail box is accepting messages and be aware we often make calls from private numbers. If you do not receive a call back within 48 business hours, please feel free to call our office again.
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
## 2024-11-29 ENCOUNTER — HOSPITAL ENCOUNTER (OUTPATIENT)
Dept: RADIOLOGY | Facility: HOSPITAL | Age: 59
Discharge: HOME | End: 2024-11-29
Payer: COMMERCIAL

## 2024-11-29 DIAGNOSIS — S06.0X0A CONCUSSION WITHOUT LOSS OF CONSCIOUSNESS, INITIAL ENCOUNTER: ICD-10-CM

## 2024-11-29 PROCEDURE — 70551 MRI BRAIN STEM W/O DYE: CPT

## 2024-11-29 PROCEDURE — 70551 MRI BRAIN STEM W/O DYE: CPT | Performed by: RADIOLOGY

## 2024-12-09 ENCOUNTER — EVALUATION (OUTPATIENT)
Dept: PHYSICAL THERAPY | Facility: CLINIC | Age: 59
End: 2024-12-09
Payer: COMMERCIAL

## 2024-12-09 DIAGNOSIS — S06.0X0A CONCUSSION WITHOUT LOSS OF CONSCIOUSNESS, INITIAL ENCOUNTER: ICD-10-CM

## 2024-12-09 DIAGNOSIS — S06.0X0D CONCUSSION WITH NO LOSS OF CONSCIOUSNESS, SUBSEQUENT ENCOUNTER: Primary | ICD-10-CM

## 2024-12-09 PROCEDURE — 97162 PT EVAL MOD COMPLEX 30 MIN: CPT | Mod: GP

## 2024-12-09 PROCEDURE — RXMED WILLOW AMBULATORY MEDICATION CHARGE

## 2024-12-09 PROCEDURE — 97110 THERAPEUTIC EXERCISES: CPT | Mod: GP

## 2024-12-09 ASSESSMENT — PAIN SCALES - GENERAL: PAINLEVEL_OUTOF10: 3

## 2024-12-09 ASSESSMENT — PAIN - FUNCTIONAL ASSESSMENT: PAIN_FUNCTIONAL_ASSESSMENT: 0-10
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
## 2024-12-12 ENCOUNTER — PHARMACY VISIT (OUTPATIENT)
Dept: PHARMACY | Facility: CLINIC | Age: 59
End: 2024-12-12
Payer: COMMERCIAL

## 2024-12-12 ENCOUNTER — TELEPHONE (OUTPATIENT)
Dept: ORTHOPEDIC SURGERY | Facility: CLINIC | Age: 59
End: 2024-12-12
Payer: COMMERCIAL

## 2024-12-16 ENCOUNTER — TREATMENT (OUTPATIENT)
Dept: PHYSICAL THERAPY | Facility: CLINIC | Age: 59
End: 2024-12-16
Payer: COMMERCIAL

## 2024-12-16 DIAGNOSIS — S06.0X0D CONCUSSION WITH NO LOSS OF CONSCIOUSNESS, SUBSEQUENT ENCOUNTER: Primary | ICD-10-CM

## 2024-12-16 PROCEDURE — 97140 MANUAL THERAPY 1/> REGIONS: CPT | Mod: GP

## 2024-12-16 PROCEDURE — 97110 THERAPEUTIC EXERCISES: CPT | Mod: GP

## 2024-12-16 ASSESSMENT — PAIN SCALES - GENERAL: PAINLEVEL_OUTOF10: 2

## 2024-12-16 ASSESSMENT — PAIN - FUNCTIONAL ASSESSMENT: PAIN_FUNCTIONAL_ASSESSMENT: 0-10

## 2024-12-16 NOTE — PROGRESS NOTES
"  Physical Therapy  Physical Therapy Concussion Treatment    Patient Name: Jessica Jordan  MRN: [id number]  Today's Date: 12/16/2024  Time Calculation  Start Time: 1245  Stop Time: 1330  Time Calculation (min): 45 min    Insurance:  Visit number: 2 of 30 PT/OT  Authorization info: auth needed  Insurance Type:  employee    General:  Reason for visit: Concussion w/o LOC  Referred by: Dr. Lawton    Precautions: anxiety and depression PMH, has been on medications for awhile  Precautions  Precautions Comment: anxiety, depression    Medical History Form: Reviewed (scanned into chart)    Subjective:   Patient reports she is doing slightly better. Had a rough day Friday but a much better day Saturday. Feels numbness in fingers is getting more widespread but less intense. HEP going well thus far, dizziness is less frequent and intense.     Pain:  Pain Assessment: 0-10  0-10 (Numeric) Pain Score: 2    Objective:  Objective     Posture: rounded shoulders, increased thoracic kyphosis       Palpation: Trp R thoracic paraspinals, TTP cervical SP's and paraspinals.        Joint Mobility: hypomobile cervical side glides B, produces pain R      Observation: no obvious deformities, appears fatigued and lethargic.      Dermatomes/Myotomes  Deferred due to recent MD visit  C4:   C5:   C6:   C7:   C8:   T1:       Reflexes  Deferred    (R)  (L)  C5:      C6:      C7:            ROM    Cervical AROM (Degrees)    Flexion: 45*  Extension: 25*  (L) Rotation: 50  (R) Rotation: 85  (L) Side Bend: 25*  (R) Side Bend: 45*  *end range symptom production      Special Tests    Spurling's: Neg  ULTT: Pos median and radial, radial worse   Sharp Skyla: pos due to symptom production no clunk or laxity felt  Alar Ligament: neg  Flexion-Rotation: pos R  DNF Endurance: 3 sec  4 stage balance: Feet together eyes open: 20\", feet semi-tandem eyes open: 20\". Tandem stance eyes open: 15\" + increased dizziness, single leg eyes open 10\" LLE, 5\" " RLE.        Vestibular/Ocular Motor Screen (VOMS)     HA Dizziness Nausea Fogginess Total Comments   Baseline 3/10 3/10 0/10 2/10 8    Smooth Pursuits 3/10 5/10 0/10 2/10 10    Saccades - Hor. 4/10 6/10 0/10 2/10 12    Saccades - Vert. 3/10 6/10 0/10 2/10 11    Convergence 3/10 5/10 0/10 2/10 10 Av cm estimate, immediately reported blurriness    VOR - Hor. 3/10 6/10 0/10 2/10 11    VOR - Vert. 3/10 3/10 0/10 2/10 8    VMS Test /10 /10 /10 /10     Deferred VMS due to provocation of symptoms during previous testing, will perform NV.       Outcome Measures:  Other Measures  Other Outcome Measures: , 28 % NDI (21 SS today)     EDUCATION:   Patient was educated on:  The multiple facets of concussion management including symptom monitoring, vestibulo-ocular, cervicogenic, exertion, and neuro-cognitive function  Pathophysiology and mechanism of injury for a concussion   The importance of balance between rest and activity initially after injury including appropriate usage of 'expose & recover' and 'Goldilocks' principles (not too much, not too little)  The Return to Function/Work protocols  Symptom control and management including minimizing potentially irritable stimuli (i.e. screens, crowded/noisy areas, driving, etc.)    Questions answered to patient & family's satisfaction & patient verbalized understanding & agreement with POC.       Goals: Set and discussed today  Active       Concussion       STGs       Start:  24    Expected End:  25       1. Complete oculomotor and vestibular assessments with minimal symptoms and w/o the presence of abnormal eye movements  2. Decrease symptom score to 15 on PCSS to progress towards LTGs.  3. Demonstrate independence with home exercise program  4. Tolerate increased exercise without adverse reaction  5. Demonstrate improved posture & proper body mechanics throughout session         LTGs       Start:  24    Expected End:  25       1. Progress  "through the Return to Function/Work protocols without increased symptoms  2. Complete oculomotor and vestibular assessments without increased symptoms or the presence of abnormal eye movements  3. Decrease symptom score to 0 on PCSS  Increase gross ROM to pain free + at least 65 degs flexion, 45 degs extension, 65 degs side bend B, 90 degs R B.  4. Increase deep neck flexor endurance strength by at least 30\" to meet the MDC.  5. Report decrease in pain by > 2 points to meet the MCID  6. Decrease score of NDI by > 5 points to meet the MCID                Plan of care was developed with input and agreement by the patient      Treatment Performed:    Therapeutic Exercise:    25 min  Pencil push-ups 3x10*  First rib mobility w/ 5-6\" holds 2x10*  Doorway pec stretch 3x20\" holds*  Semi-tandem stance balance 2x30\"*  Thoracic extensions over chair 2x10 w/ 3-4\" holds*  No monies w/ yellow TB 2x10*    * = added to HEP.  Sheet with exercise descriptions and images issued.  Skilled intervention utilized in the appropriate selection & application of above exercises.  Verbal and tactile cues provided for proper form and technique.  Pt. demonstrated appropriate form & verbalized understanding of optimal technique for above exercises.    [email or web address]  Access Code: [email or web address]      Manual Therapy:    20 min  Suboccipital release  UT release B  OA nodding  Prone thoracic PA glides  Trp release B upper trap  Seated 1st rib mobs    Neuromuscular Re-education:   min      Other:      min         PT Therapeutic Procedures Time Entry  Manual Therapy Time Entry: 20  Therapeutic Exercise Time Entry: 25                      Assessment:  Patient tolerated today's session w/ a reported reduction in cervical and thoracic stiffness/tightness. Reported mild increases in digit pins and needles sensation w/ scalene mobility, as well as mild dizziness w/ semi-tandem balance which quickly resolved w/ rest and did not limit " participation in today's session. Demonstrates improvements in activity tolerance through completion of full session of treatment, as well as reported improvements in ADLs. Patient is progressing and will benefit from ongoing PT services to continue to progress balance, cervical and thoracic mobility, as well as scapular and cervical strengthening activities as tolerated to reach established goals to return to PLOF.     Plan:  Dual tasking trial w/ aerobic activity. Continue manual, reactionary balance. Trial of rows and T raises for scapular strengthening progression.     Sonido Dwyer, PT, ATC

## 2024-12-30 ENCOUNTER — OFFICE VISIT (OUTPATIENT)
Dept: SPORTS MEDICINE | Facility: HOSPITAL | Age: 59
End: 2024-12-30
Payer: COMMERCIAL

## 2024-12-30 ENCOUNTER — PHARMACY VISIT (OUTPATIENT)
Dept: PHARMACY | Facility: CLINIC | Age: 59
End: 2024-12-30
Payer: COMMERCIAL

## 2024-12-30 ENCOUNTER — APPOINTMENT (OUTPATIENT)
Dept: RADIOLOGY | Facility: HOSPITAL | Age: 59
End: 2024-12-30
Payer: COMMERCIAL

## 2024-12-30 ENCOUNTER — HOSPITAL ENCOUNTER (OUTPATIENT)
Dept: RADIOLOGY | Facility: HOSPITAL | Age: 59
Discharge: HOME | End: 2024-12-30
Payer: COMMERCIAL

## 2024-12-30 VITALS — SYSTOLIC BLOOD PRESSURE: 133 MMHG | HEART RATE: 89 BPM | DIASTOLIC BLOOD PRESSURE: 92 MMHG | OXYGEN SATURATION: 100 %

## 2024-12-30 DIAGNOSIS — M54.2 NECK PAIN: ICD-10-CM

## 2024-12-30 DIAGNOSIS — M54.50 LUMBAR PAIN: ICD-10-CM

## 2024-12-30 DIAGNOSIS — M54.50 ACUTE MIDLINE LOW BACK PAIN WITHOUT SCIATICA: ICD-10-CM

## 2024-12-30 DIAGNOSIS — M43.17 SPONDYLOLISTHESIS OF LUMBOSACRAL REGION: ICD-10-CM

## 2024-12-30 DIAGNOSIS — M54.2 NECK PAIN: Primary | ICD-10-CM

## 2024-12-30 DIAGNOSIS — S06.0X0A CONCUSSION WITHOUT LOSS OF CONSCIOUSNESS, INITIAL ENCOUNTER: ICD-10-CM

## 2024-12-30 PROCEDURE — 99214 OFFICE O/P EST MOD 30 MIN: CPT | Performed by: PEDIATRICS

## 2024-12-30 PROCEDURE — RXMED WILLOW AMBULATORY MEDICATION CHARGE

## 2024-12-30 PROCEDURE — 72050 X-RAY EXAM NECK SPINE 4/5VWS: CPT

## 2024-12-30 PROCEDURE — 72120 X-RAY BEND ONLY L-S SPINE: CPT

## 2024-12-30 PROCEDURE — 72110 X-RAY EXAM L-2 SPINE 4/>VWS: CPT | Performed by: RADIOLOGY

## 2024-12-30 PROCEDURE — 72050 X-RAY EXAM NECK SPINE 4/5VWS: CPT | Performed by: RADIOLOGY

## 2024-12-30 RX ORDER — NAPROXEN 500 MG/1
500 TABLET ORAL 2 TIMES DAILY
Qty: 60 TABLET | Refills: 0 | Status: SHIPPED | OUTPATIENT
Start: 2024-12-30 | End: 2025-01-29

## 2024-12-30 RX ORDER — CYCLOBENZAPRINE HCL 5 MG
5 TABLET ORAL 3 TIMES DAILY PRN
Qty: 30 TABLET | Refills: 0 | Status: SHIPPED | OUTPATIENT
Start: 2024-12-30 | End: 2025-01-09
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
## 2025-01-06 PROCEDURE — RXMED WILLOW AMBULATORY MEDICATION CHARGE

## 2025-01-07 ENCOUNTER — PHARMACY VISIT (OUTPATIENT)
Dept: PHARMACY | Facility: CLINIC | Age: 60
End: 2025-01-07
Payer: COMMERCIAL

## 2025-01-07 ENCOUNTER — APPOINTMENT (OUTPATIENT)
Dept: PRIMARY CARE | Facility: CLINIC | Age: 60
End: 2025-01-07
Payer: COMMERCIAL

## 2025-01-07 ENCOUNTER — TREATMENT (OUTPATIENT)
Dept: PHYSICAL THERAPY | Facility: CLINIC | Age: 60
End: 2025-01-07
Payer: COMMERCIAL

## 2025-01-07 DIAGNOSIS — S06.0X0D CONCUSSION WITH NO LOSS OF CONSCIOUSNESS, SUBSEQUENT ENCOUNTER: Primary | ICD-10-CM

## 2025-01-07 PROCEDURE — 97140 MANUAL THERAPY 1/> REGIONS: CPT | Mod: GP

## 2025-01-07 PROCEDURE — 97110 THERAPEUTIC EXERCISES: CPT | Mod: GP

## 2025-01-07 ASSESSMENT — PAIN SCALES - GENERAL: PAINLEVEL_OUTOF10: 2

## 2025-01-07 ASSESSMENT — PAIN - FUNCTIONAL ASSESSMENT: PAIN_FUNCTIONAL_ASSESSMENT: 0-10

## 2025-01-07 NOTE — PROGRESS NOTES
"  Physical Therapy  Physical Therapy Concussion Treatment    Patient Name: Jessica Jordan  MRN: [id number]  Today's Date: 1/7/2025  Time Calculation  Start Time: 0700  Stop Time: 0745  Time Calculation (min): 45 min    Insurance:  Visit number: 3 of 18 PT/OT  Authorization info: auth needed  Insurance Type:  employee    General:  Reason for visit: Concussion w/o LOC  Referred by: Dr. Lawton    Precautions: anxiety and depression PMH, has been on medications for awhile  Precautions  Precautions Comment: anxiety, depression    Medical History Form: Reviewed (scanned into chart)    Subjective:   Patient reports she is feeling better. Able to tolerate more tasks throughout the day before dizziness and HA's require her to rest. Had a recent incident of back pain which caused her to follow up w/ Dr. Lawton. Has mostly resolved since then, would like a few exercises to address this as well if possible today. Pain was localized to SIJ when it was present.     Pain:  Pain Assessment: 0-10  0-10 (Numeric) Pain Score: 2    Objective:  Objective     Posture: rounded shoulders, increased thoracic kyphosis       Palpation: Trp R thoracic paraspinals, TTP cervical SP's and paraspinals.        Joint Mobility: hypomobile cervical side glides B, produces pain R      Observation: Appears more energetic compared to previous sessions      Dermatomes/Myotomes  Deferred due to recent MD visit  C4:   C5:   C6:   C7:   C8:   T1:       Reflexes  Deferred    (R)  (L)  C5:      C6:      C7:            ROM    Cervical AROM (Degrees)    Flexion: 45*  Extension: 25*  (L) Rotation: 50  (R) Rotation: 85  (L) Side Bend: 25*  (R) Side Bend: 45*  *end range symptom production      Special Tests    Spurling's: Neg  ULTT: Pos median and radial, radial worse   Sharp Skyla: pos due to symptom production no clunk or laxity felt  Alar Ligament: neg  Flexion-Rotation: pos R  DNF Endurance: 3 sec  4 stage balance: Feet together eyes open: 20\", feet " "semi-tandem eyes open: 20\". Tandem stance eyes open: 15\" + increased dizziness, single leg eyes open 10\" LLE, 5\" RLE.        Vestibular/Ocular Motor Screen (VOMS)      HA Dizziness Nausea Fogginess Total Comments   Baseline 3/10 3/10 0/10 2/10 8    Smooth Pursuits 3/10 5/10 0/10 2/10 10    Saccades - Hor. 4/10 6/10 0/10 2/10 12    Saccades - Vert. 3/10 6/10 0/10 2/10 11    Convergence 3/10 5/10 0/10 2/10 10 Av cm estimate, immediately reported blurriness    VOR - Hor. 3/10 6/10 0/10 2/10 11    VOR - Vert. 3/10 3/10 0/10 2/10 8    VMS Test /10 /10 /10 /10     Deferred VMS due to provocation of symptoms during previous testing, will perform NV.       Outcome Measures:        EDUCATION:   Patient was educated on:  The multiple facets of concussion management including symptom monitoring, vestibulo-ocular, cervicogenic, exertion, and neuro-cognitive function  Pathophysiology and mechanism of injury for a concussion   The importance of balance between rest and activity initially after injury including appropriate usage of 'expose & recover' and 'Goldilocks' principles (not too much, not too little)  The Return to Function/Work protocols  Symptom control and management including minimizing potentially irritable stimuli (i.e. screens, crowded/noisy areas, driving, etc.)    Questions answered to patient & family's satisfaction & patient verbalized understanding & agreement with POC.       Goals: Set and discussed today  Active       Concussion       STGs (Progressing)       Start:  24    Expected End:  25       1. Complete oculomotor and vestibular assessments with minimal symptoms and w/o the presence of abnormal eye movements  2. Decrease symptom score to 15 on PCSS to progress towards LTGs.  3. Demonstrate independence with home exercise program  4. Tolerate increased exercise without adverse reaction  5. Demonstrate improved posture & proper body mechanics throughout session         LTGs       Start:  " "12/09/24    Expected End:  02/03/25       1. Progress through the Return to Function/Work protocols without increased symptoms  2. Complete oculomotor and vestibular assessments without increased symptoms or the presence of abnormal eye movements  3. Decrease symptom score to 0 on PCSS  Increase gross ROM to pain free + at least 65 degs flexion, 45 degs extension, 65 degs side bend B, 90 degs R B.  4. Increase deep neck flexor endurance strength by at least 30\" to meet the MDC.  5. Report decrease in pain by > 2 points to meet the MCID  6. Decrease score of NDI by > 5 points to meet the MCID                Plan of care was developed with input and agreement by the patient      Treatment Performed:    Therapeutic Exercise:    25 min  Hip adductor isometrics w/ ball and 5-6\" holds x10*  Hip abductor isometrics w/ belt and 5-6 x10*  Hip flex/ext alt isometrics w/ dowel and 5-6\" holds x10 each  Dual tasking reactionary reaches w/ UE and blaze pods 6x20\"  Yellow stroops TB rows 2x10*  Yellow stroops TB T raises in standing x10 per side  Recumbent bike seat 8 x3'    * = added to HEP.  Sheet with exercise descriptions and images issued.  Skilled intervention utilized in the appropriate selection & application of above exercises.  Verbal and tactile cues provided for proper form and technique.  Pt. demonstrated appropriate form & verbalized understanding of optimal technique for above exercises.    [email or web address]  Access Code: [email or web address]      Manual Therapy:    20 min  Suboccipital release  UT release B  C2-C7 up glides grade 2-3 B  Trp release B upper trap in seated  C0-C7 B TP PA glides grade 2-3    Neuromuscular Re-education:   min      Other:      min         PT Therapeutic Procedures Time Entry  Manual Therapy Time Entry: 20  Therapeutic Exercise Time Entry: 25                      Assessment:  Patient tolerated today's session w/ expected muscle fatigue. Demonstrates improvements in " activity tolerance displayed through introduction to dual tasking and low intensity cardio w/o an increase in familiar symptoms. Patient felt symptoms at 3' cristi of recumbent bike use, exercise was discontinued at that time. Patient is progressing slowly and will benefit from ongoing PT services to continue to progress dual tasking, low intensity cardio and cervical and thoracic mobility and strengthening activities to reach established goals to return to PLOF.       Plan:  Continue manual, progress low intensity cardio, dual tasking and scapular strengthening as tolerated. Trial of Y raises, banded pull-a-parts, serial 7 tandem balance.     Sonido Dwyer, PT, ATC

## 2025-01-14 ENCOUNTER — TREATMENT (OUTPATIENT)
Dept: PHYSICAL THERAPY | Facility: CLINIC | Age: 60
End: 2025-01-14
Payer: COMMERCIAL

## 2025-01-14 DIAGNOSIS — S06.0X0D CONCUSSION WITH NO LOSS OF CONSCIOUSNESS, SUBSEQUENT ENCOUNTER: Primary | ICD-10-CM

## 2025-01-14 PROCEDURE — 97110 THERAPEUTIC EXERCISES: CPT | Mod: GP

## 2025-01-14 PROCEDURE — 97140 MANUAL THERAPY 1/> REGIONS: CPT | Mod: GP

## 2025-01-14 ASSESSMENT — PAIN - FUNCTIONAL ASSESSMENT: PAIN_FUNCTIONAL_ASSESSMENT: 0-10

## 2025-01-14 ASSESSMENT — PAIN SCALES - GENERAL: PAINLEVEL_OUTOF10: 0 - NO PAIN

## 2025-01-14 NOTE — PROGRESS NOTES
"  Physical Therapy  Physical Therapy Concussion Treatment    Patient Name: Jessica Jordan  MRN: [id number]  Today's Date: 1/14/2025  Time Calculation  Start Time: 0700  Stop Time: 0745  Time Calculation (min): 45 min    Insurance:  Visit number: 4 of 18 PT/OT  Authorization info: auth needed  Insurance Type:  employee    General:  Reason for visit: Concussion w/o LOC  Referred by: Dr. Lawton    Precautions: anxiety and depression PMH, has been on medications for awhile  Precautions  Precautions Comment: anxiety, depression    Medical History Form: Reviewed (scanned into chart)    Subjective:   Patient reports she is feeling better. Less fatigued and tired throughout the day, headaches are less frequent and severe. No back pain or headaches entering clinic today. Feels remaining deficits are dizziness w/ quick turns.     Pain:  Pain Assessment: 0-10  0-10 (Numeric) Pain Score: 0 - No pain    Objective:  Objective     Posture: rounded shoulders, increased thoracic kyphosis       Palpation: Trp R thoracic paraspinals, TTP cervical SP's and paraspinals.        Joint Mobility: hypomobile cervical side glides B, produces pain R      Observation: Appears more energetic compared to previous sessions      Dermatomes/Myotomes  Deferred due to recent MD visit  C4:   C5:   C6:   C7:   C8:   T1:       Reflexes  Deferred    (R)  (L)  C5:      C6:      C7:            ROM    Cervical AROM (Degrees)    Flexion: 45*  Extension: 25*  (L) Rotation: 50  (R) Rotation: 85  (L) Side Bend: 25*  (R) Side Bend: 45*  *end range symptom production      Special Tests    Spurling's: Neg  ULTT: Pos median and radial, radial worse   Sharp Skyla: pos due to symptom production no clunk or laxity felt  Alar Ligament: neg  Flexion-Rotation: pos R  DNF Endurance: 3 sec  4 stage balance: Feet together eyes open: 20\", feet semi-tandem eyes open: 20\". Tandem stance eyes open: 15\" + increased dizziness, single leg eyes open 10\" LLE, 5\" " RLE.        Vestibular/Ocular Motor Screen (VOMS)      HA Dizziness Nausea Fogginess Total Comments   Baseline 3/10 3/10 0/10 2/10 8    Smooth Pursuits 3/10 5/10 0/10 2/10 10    Saccades - Hor. 4/10 6/10 0/10 2/10 12    Saccades - Vert. 3/10 6/10 0/10 2/10 11    Convergence 3/10 5/10 0/10 2/10 10 Av cm estimate, immediately reported blurriness    VOR - Hor. 3/10 6/10 0/10 2/10 11    VOR - Vert. 3/10 3/10 0/10 2/10 8    VMS Test /10 /10 /10 /10     Deferred VMS due to provocation of symptoms during previous testing, will perform NV.       Outcome Measures:        EDUCATION:   Patient was educated on:  The multiple facets of concussion management including symptom monitoring, vestibulo-ocular, cervicogenic, exertion, and neuro-cognitive function  Pathophysiology and mechanism of injury for a concussion   The importance of balance between rest and activity initially after injury including appropriate usage of 'expose & recover' and 'Goldilocks' principles (not too much, not too little)  The Return to Function/Work protocols  Symptom control and management including minimizing potentially irritable stimuli (i.e. screens, crowded/noisy areas, driving, etc.)    Questions answered to patient & family's satisfaction & patient verbalized understanding & agreement with POC.       Goals: Set and discussed today  Active       Concussion       STGs (Progressing)       Start:  24    Expected End:  25       1. Complete oculomotor and vestibular assessments with minimal symptoms and w/o the presence of abnormal eye movements  2. Decrease symptom score to 15 on PCSS to progress towards LTGs.  3. Demonstrate independence with home exercise program  4. Tolerate increased exercise without adverse reaction  5. Demonstrate improved posture & proper body mechanics throughout session         LTGs       Start:  24    Expected End:  25       1. Progress through the Return to Function/Work protocols without  "increased symptoms  2. Complete oculomotor and vestibular assessments without increased symptoms or the presence of abnormal eye movements  3. Decrease symptom score to 0 on PCSS  Increase gross ROM to pain free + at least 65 degs flexion, 45 degs extension, 65 degs side bend B, 90 degs R B.  4. Increase deep neck flexor endurance strength by at least 30\" to meet the MDC.  5. Report decrease in pain by > 2 points to meet the MCID  6. Decrease score of NDI by > 5 points to meet the MCID                Plan of care was developed with input and agreement by the patient      Treatment Performed:    Therapeutic Exercise:    25 min  Recumbent bike seat 4 x5'  Prone Y raises 2x12 per side   Red TB pull a parts 2x12*  Dual tasking walking w/ head turns w/ verbal cueing 2x50'  Dual tasking walking w/ head turns and serial 10 counting 2x50'  Dual tasking walking w/ vertical head turns and serial 10's and 3x50'  VOR horizontal x4 until symptomatic*    * = added to HEP.  Sheet with exercise descriptions and images issued.  Skilled intervention utilized in the appropriate selection & application of above exercises.  Verbal and tactile cues provided for proper form and technique.  Pt. demonstrated appropriate form & verbalized understanding of optimal technique for above exercises.    [email or web address]  Access Code: [email or web address]      Manual Therapy:    20 min  Suboccipital release  UT release B  C0-C7 B TP PA glides grade 2-3  C0-C7 B TP PA glides grade 2-3  STM posterior cuff, SCM, scalenes and scapular ST  Prone CTJ prayer  PA glides grade 2-3    Neuromuscular Re-education:   min      Other:      min         PT Therapeutic Procedures Time Entry  Manual Therapy Time Entry: 20  Therapeutic Exercise Time Entry: 25                      Assessment:  Patient tolerated today's session w/ no familiar symptom production, mild fatigue at the conclusion. Demonstrates improvements in activity tolerance through " progression of dual tasking to include walking w/ head turns and walking w/ serial counting w/o production of headaches or dizziness. Patient is progressing slowly and will benefit from ongoing PT services to continue to progress dual tasking activities, cognitive load, as well as cervical mobility, strengthening and scapular strengthening as tolerated to reach established goals to return to PLOF.       Plan:  Recheck and update goals, continue manual. Progress low intensity cardio, dual tasking and scapular strengthening as tolerated. Trial of reactionary blaze pod LE hits/reaches, standing T raises and VOR w/ head turns w/ walking.      Sonido Dwyer, PT, ATC

## 2025-01-15 ENCOUNTER — PROCEDURE VISIT (OUTPATIENT)
Dept: PSYCHOLOGY | Facility: CLINIC | Age: 60
End: 2025-01-15
Payer: COMMERCIAL

## 2025-01-15 DIAGNOSIS — S06.0X0A CONCUSSION WITHOUT LOSS OF CONSCIOUSNESS, INITIAL ENCOUNTER: Primary | ICD-10-CM

## 2025-01-15 DIAGNOSIS — F41.9 ANXIETY DISORDER, UNSPECIFIED TYPE: ICD-10-CM

## 2025-01-15 PROBLEM — S06.0X0D CONCUSSION WITH NO LOSS OF CONSCIOUSNESS, SUBSEQUENT ENCOUNTER: Status: ACTIVE | Noted: 2023-04-11

## 2025-01-15 PROCEDURE — 96133 NRPSYC TST EVAL PHYS/QHP EA: CPT | Mod: AH | Performed by: CLINICAL NEUROPSYCHOLOGIST

## 2025-01-15 PROCEDURE — 96132 NRPSYC TST EVAL PHYS/QHP 1ST: CPT | Mod: AH | Performed by: CLINICAL NEUROPSYCHOLOGIST

## 2025-01-15 PROCEDURE — 96116 NUBHVL XM PHYS/QHP 1ST HR: CPT | Performed by: CLINICAL NEUROPSYCHOLOGIST

## 2025-01-15 PROCEDURE — 96133 NRPSYC TST EVAL PHYS/QHP EA: CPT | Performed by: CLINICAL NEUROPSYCHOLOGIST

## 2025-01-15 PROCEDURE — 96138 PSYCL/NRPSYC TECH 1ST: CPT | Performed by: CLINICAL NEUROPSYCHOLOGIST

## 2025-01-15 PROCEDURE — 96116 NUBHVL XM PHYS/QHP 1ST HR: CPT | Mod: AH | Performed by: CLINICAL NEUROPSYCHOLOGIST

## 2025-01-15 PROCEDURE — 96138 PSYCL/NRPSYC TECH 1ST: CPT | Mod: AH | Performed by: CLINICAL NEUROPSYCHOLOGIST

## 2025-01-15 PROCEDURE — 96139 PSYCL/NRPSYC TST TECH EA: CPT | Mod: AH | Performed by: CLINICAL NEUROPSYCHOLOGIST

## 2025-01-15 PROCEDURE — 96132 NRPSYC TST EVAL PHYS/QHP 1ST: CPT | Performed by: CLINICAL NEUROPSYCHOLOGIST

## 2025-01-15 PROCEDURE — 96139 PSYCL/NRPSYC TST TECH EA: CPT | Performed by: CLINICAL NEUROPSYCHOLOGIST

## 2025-01-15 NOTE — PROGRESS NOTES
Name: NEUROPSYCHOLOGICAL DATA SUMMARY    Name: Jessica Jordan  : 1965  MRN: [id number]    Date of Service: 01/15/25    Age: 59 y.o.  Race:   Education: 16  Preferred Hand: RH  Examiner: Gabe Child, PhD  Psychometrist: Barbra Tanner    Descriptors:    T-Score Standard Score Z-Score Scaled Score %ile Rank   Exceptionally High > 70 > 130 > 2.0 > 16 > 98   Above Average 64-69 120-129 1.4-1.9 15 91-97   High Average 57-63 110-119 0.7-1.3 12-14 75-90   Average 43-56  0.6 to -0.6 8-11 25-74   Low Average 37-42 80-89 -1.3 to -0.7 6-7 9-24   Below Average 30-36 70-79 -2.0 to -1.4 4-5 2-8   Exceptionally Low < 30 < 70 < -2.0 < 4 < 2     WTAR 1 Raw  Std. Sc  %ile   Total Correct  37  107  68   FSIQ-Est  -  109  73     WAIS - IV 1      Percep Std-equiv  %ile   Matrix Reasoning 85  16   ProcSpd Std-equiv  %ile   Symbol Search 110  75   Coding 90  25   Quotient Index  %ile   Processing Speed index 100  50     WAIS-III3 Raw  Std-equiv  %ile   Digit Span                    Total 18  105  63      Raw  Std-equiv  %ile   Trail Making 2 Part A 41 (1) 84  14          Part B  (Errors) 77 (0) 91  27     HVLT-R 1 (Form 1) Raw  Std-equiv  %ile   Trial 1 7  96  39   Trial 2 8  87  19   Trial 3 9  84  14   Total Recall (Sum of T1 - T3) 24  87  19   Learning [(Best of T2 or T3) - T1] 2       Trial 4 (Delayed Recall) 8  85  16   % Retained  89%  97  42   T+  12  See Discrim. Index   F+ 1  See Discrim. Index   Discrimination Index (T+ - F+) 11  103  58     BVMT-R 1 (Form 1)       Raw  Std-equiv  %ile   Trial 1 4  90  24   Trial 2 9  108  69   Trial 3 10  108  69   Total Recall (Sum of T1 - T3) 23  102  54   Learning [(Best of T2 or T3) - T1] 6  120  90   Trial 4 (Delayed Recall) 8  96  38   % Retained  80%  -  11-16   T+ (F+) 5(0)  See Discrim. Index   Discrimination Index (T+ - F+) 5  -  11-16   Copy Score  11          Raw       TOMM 1 47  --  --   Dots 1 10  6.8 v. 2.3  1e   RDS 1 9            Raw  T  %ile    BDI-II 1           Total 12       STAI 1                                  State 34  46  42     Trait 29  39  14     Post Concussive Scale    (Pre) 13 (13)                                           (Post) 15 (13)     Normative References:  Test Manual  SARAH Martel., SARAH Martel, & [employer] (2004). Revised comprehensive norms for an expanded Malu-Reitan battery: Demographically adjusted neuropsychological norms for  and  adults, professional manual. Demetri Redding: Psychological Assessment Resources
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
## 2025-01-21 ENCOUNTER — TREATMENT (OUTPATIENT)
Dept: PHYSICAL THERAPY | Facility: CLINIC | Age: 60
End: 2025-01-21
Payer: COMMERCIAL

## 2025-01-21 DIAGNOSIS — S06.0X0D CONCUSSION WITH NO LOSS OF CONSCIOUSNESS, SUBSEQUENT ENCOUNTER: Primary | ICD-10-CM

## 2025-01-21 PROCEDURE — 97140 MANUAL THERAPY 1/> REGIONS: CPT | Mod: GP

## 2025-01-21 PROCEDURE — 97110 THERAPEUTIC EXERCISES: CPT | Mod: GP

## 2025-01-21 ASSESSMENT — PAIN - FUNCTIONAL ASSESSMENT: PAIN_FUNCTIONAL_ASSESSMENT: 0-10

## 2025-01-21 ASSESSMENT — PAIN SCALES - GENERAL: PAINLEVEL_OUTOF10: 0 - NO PAIN

## 2025-01-21 NOTE — PROGRESS NOTES
"  Physical Therapy  Physical Therapy Concussion Treatment    Patient Name: Jessica Joradn  MRN: [id number]  Today's Date: 1/21/2025  Time Calculation  Start Time: 0700  Stop Time: 0743  Time Calculation (min): 43 min    Insurance:  Visit number: 5 of 18 PT/OT  Authorization info: auth needed  Insurance Type:  employee    General:  Reason for visit: Concussion w/o LOC  Referred by: Dr. Lawton    Precautions: anxiety and depression PMH, has been on medications for awhile  Precautions  Precautions Comment: anxiety, depression    Medical History Form: Reviewed (scanned into chart)    Subjective:   Patient reports that she is feeling better each day. No new issues and feels she is ready to progress her activity. Return to work date is set for February to ease back into it.     Pain:  Pain Assessment: 0-10  0-10 (Numeric) Pain Score: 0 - No pain    Objective:  Objective     Posture: rounded shoulders, increased thoracic kyphosis       Palpation: Trp R thoracic paraspinals, TTP cervical SP's and paraspinals.        Joint Mobility: hypomobile cervical side glides B, produces pain R      Observation: Appears more energetic compared to previous sessions      Dermatomes/Myotomes  Deferred due to recent MD visit  C4:   C5:   C6:   C7:   C8:   T1:       Reflexes  Deferred    (R)  (L)  C5:      C6:      C7:            ROM    Cervical AROM (Degrees) updated 1/21    Flexion: 45*  Extension: 50*  (L) Rotation: 50  (R) Rotation: 85  (L) Side Bend: 35*  (R) Side Bend: 45  *end range symptom production      Special Tests     Spurling's: Neg  ULTT: Pos median and radial, radial worse   Sharp Skyla: pos due to symptom production no clunk or laxity felt  Alar Ligament: neg  Flexion-Rotation: pos R  DNF Endurance: 3 sec  4 stage balance: Feet together eyes open: 20\", feet semi-tandem eyes open: 20\". Tandem stance eyes open: 15\" + increased dizziness, single leg eyes open 10\" LLE, 5\" RLE.        Vestibular/Ocular Motor Screen (VOMS) " updated      HA Dizziness Nausea Fogginess Total Comments   Baseline 0/10 0/10 0/10 0/10 0    Smooth Pursuits 1/10 1/10 0/10 1/10 3    Saccades - Hor. 1/10 1/10 0/10 1/10 3    Saccades - Vert. 1/10 1/10 0/10 1/10 3    Convergence 1/10 1/10 0/10 1/10 3 Av cm   VOR - Hor. 2/10 2/10 0/10 2/10 6    VOR - Vert. 1/10 1/10 0/10 1/10 3    VMS Test 2/10 2/10 0/10 2/10 6        Outcome Measures:        EDUCATION:   Patient was educated on:  The multiple facets of concussion management including symptom monitoring, vestibulo-ocular, cervicogenic, exertion, and neuro-cognitive function  Pathophysiology and mechanism of injury for a concussion   The importance of balance between rest and activity initially after injury including appropriate usage of 'expose & recover' and 'Goldilocks' principles (not too much, not too little)  The Return to Function/Work protocols  Symptom control and management including minimizing potentially irritable stimuli (i.e. screens, crowded/noisy areas, driving, etc.)    Questions answered to patient & family's satisfaction & patient verbalized understanding & agreement with POC.       Goals: Set and discussed today  Active       Concussion       STGs (Progressing)       Start:  24    Expected End:  25       1. Complete oculomotor and vestibular assessments with minimal symptoms and w/o the presence of abnormal eye movements  2. Decrease symptom score to 15 on PCSS to progress towards LTGs.  3. Demonstrate independence with home exercise program  4. Tolerate increased exercise without adverse reaction  5. Demonstrate improved posture & proper body mechanics throughout session         LTGs (Progressing)       Start:  24    Expected End:  25       1. Progress through the Return to Function/Work protocols without increased symptoms  2. Complete oculomotor and vestibular assessments without increased symptoms or the presence of abnormal eye movements  3. Decrease symptom  "score to 0 on PCSS  Increase gross ROM to pain free + at least 65 degs flexion, 45 degs extension, 65 degs side bend B, 90 degs R B.  4. Increase deep neck flexor endurance strength by at least 30\" to meet the MDC.  5. Report decrease in pain by > 2 points to meet the MCID  6. Decrease score of NDI by > 5 points to meet the MCID                Plan of care was developed with input and agreement by the patient      Treatment Performed:    Therapeutic Exercise:    23 min  Recumbent bike seat 4 x6'  VOMS testing  VOR horizontal x2 in seated until symptoms x3*  VOR vertical x2 in seated until symptoms x3*  Walking w/ horizontal head turns 3x50'*  Walking w/ vertical head turns 4x50'      * = added to HEP.  Sheet with exercise descriptions and images issued.  Skilled intervention utilized in the appropriate selection & application of above exercises.  Verbal and tactile cues provided for proper form and technique.  Pt. demonstrated appropriate form & verbalized understanding of optimal technique for above exercises.    [email or web address]  Access Code: [email or web address]      Manual Therapy:     20 min  Suboccipital release  UT release B  C0-C7 B TP PA glides grade 2-3  STM posterior cuff  Prone CTJ prayer  PA glides grade 2-3    Neuromuscular Re-education:   min      Other:      min         PT Therapeutic Procedures Time Entry  Manual Therapy Time Entry: 20  Therapeutic Exercise Time Entry: 23                      Assessment:  Patient tolerated today's session w/ expected symptom production during there-ex and VOMS testing. Demonstrates improvements in activity tolerance displayed through progression of activities to included VOR vertical and horizontal x2, as well as walking w/ head turns w/o adverse reaction. Patient is progressing and will benefit from ongoing PT services to continue to progress scapular strengthening and VOR activities as tolerated to reach established goals to return to PLOF. "         Plan:  Trial of reactionary blaze pod LE hits/reaches, standing T raises. Progress VOR speed as tolerated and continue manual prn. Recheck DNF garth,     Sonido Dwyer, PT, ATC

## 2025-01-22 ENCOUNTER — APPOINTMENT (OUTPATIENT)
Dept: PSYCHOLOGY | Facility: CLINIC | Age: 60
End: 2025-01-22
Payer: COMMERCIAL
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
## 2025-01-24 ENCOUNTER — OFFICE VISIT (OUTPATIENT)
Dept: ORTHOPEDIC SURGERY | Facility: CLINIC | Age: 60
End: 2025-01-24
Payer: COMMERCIAL

## 2025-01-24 VITALS
WEIGHT: 139.99 LBS | DIASTOLIC BLOOD PRESSURE: 81 MMHG | HEIGHT: 61 IN | BODY MASS INDEX: 26.43 KG/M2 | HEART RATE: 91 BPM | SYSTOLIC BLOOD PRESSURE: 146 MMHG

## 2025-01-24 DIAGNOSIS — M47.812 CERVICAL SPONDYLOSIS: ICD-10-CM

## 2025-01-24 DIAGNOSIS — M43.16 SPONDYLOLISTHESIS OF LUMBAR REGION: ICD-10-CM

## 2025-01-24 DIAGNOSIS — S06.0X0A CONCUSSION WITHOUT LOSS OF CONSCIOUSNESS, INITIAL ENCOUNTER: Primary | ICD-10-CM

## 2025-01-24 DIAGNOSIS — R42 DIZZINESSES: ICD-10-CM

## 2025-01-24 PROCEDURE — 99214 OFFICE O/P EST MOD 30 MIN: CPT | Performed by: PEDIATRICS

## 2025-01-24 PROCEDURE — 3008F BODY MASS INDEX DOCD: CPT | Performed by: PEDIATRICS

## 2025-01-24 ASSESSMENT — PAIN SCALES - GENERAL: PAINLEVEL_OUTOF10: 0-NO PAIN

## 2025-01-27 PROBLEM — M43.16 SPONDYLOLISTHESIS OF LUMBAR REGION: Status: ACTIVE | Noted: 2025-01-27

## 2025-01-27 PROBLEM — M47.812 CERVICAL SPONDYLOSIS: Status: ACTIVE | Noted: 2025-01-27

## 2025-01-28 ENCOUNTER — TREATMENT (OUTPATIENT)
Dept: PHYSICAL THERAPY | Facility: CLINIC | Age: 60
End: 2025-01-28
Payer: COMMERCIAL

## 2025-01-28 DIAGNOSIS — S06.0X0D CONCUSSION WITH NO LOSS OF CONSCIOUSNESS, SUBSEQUENT ENCOUNTER: Primary | ICD-10-CM

## 2025-01-28 PROCEDURE — 97110 THERAPEUTIC EXERCISES: CPT | Mod: GP

## 2025-01-28 PROCEDURE — 97140 MANUAL THERAPY 1/> REGIONS: CPT | Mod: GP

## 2025-01-28 ASSESSMENT — PAIN SCALES - GENERAL: PAINLEVEL_OUTOF10: 3

## 2025-01-28 ASSESSMENT — PAIN - FUNCTIONAL ASSESSMENT: PAIN_FUNCTIONAL_ASSESSMENT: 0-10

## 2025-01-28 NOTE — PROGRESS NOTES
"  Physical Therapy  Physical Therapy Concussion Treatment    Patient Name: Jessica Jordan  MRN: [id number]  Today's Date: 1/28/2025  Time Calculation  Start Time: 0705  Stop Time: 0748  Time Calculation (min): 43 min    Insurance:  Visit number: 6 of 18 PT/OT  Authorization info: auth needed  Insurance Type:  employee    General:  Reason for visit: Concussion w/o LOC  Referred by: Dr. Lawton    Precautions: anxiety and depression PMH, has been on medications for awhile  Precautions  Precautions Comment: anxiety, depression    Medical History Form: Reviewed (scanned into chart)    Subjective:   Patient reports she is feeling a little flared up today. Mild headache and numbness/tingling into her R digits has been very prominent last few days. When entering clinic today states her entire hand is numb.     Pain:  Pain Assessment: 0-10  0-10 (Numeric) Pain Score: 3    Objective:  Objective     Posture: rounded shoulders, increased thoracic kyphosis       Palpation: Trp R thoracic paraspinals, TTP cervical SP's and paraspinals.        Joint Mobility: hypomobile cervical side glides B, produces pain R      Observation: Appears more energetic compared to previous sessions      Dermatomes/Myotomes  Deferred due to recent MD visit  C4:   C5:   C6:   C7:   C8:   T1:       Reflexes  Deferred    (R)  (L)  C5:      C6:      C7:            ROM    Cervical AROM (Degrees) updated 1/21    Flexion: 45*  Extension: 50*  (L) Rotation: 50  (R) Rotation: 85  (L) Side Bend: 35*  (R) Side Bend: 45  *end range symptom production      Special Tests     Spurling's: Neg  ULTT: Pos median and radial, radial worse   Sharp Skyla: pos due to symptom production no clunk or laxity felt  Alar Ligament: neg  Flexion-Rotation: pos R  DNF Endurance: 3 sec   4 stage balance: Feet together eyes open: 20\", feet semi-tandem eyes open: 20\". Tandem stance eyes open: 15\" + increased dizziness, single leg eyes open 10\" LLE, 5\" RLE.        Vestibular/Ocular " Motor Screen (VOMS) updated      HA Dizziness Nausea Fogginess Total Comments   Baseline 0/10 0/10 0/10 0/10 0    Smooth Pursuits 1/10 1/10 0/10 1/10 3    Saccades - Hor. 1/10 1/10 0/10 1/10 3    Saccades - Vert. 1/10 1/10 0/10 1/10 3    Convergence 1/10 1/10 0/10 1/10 3 Av cm   VOR - Hor. 2/10 2/10 0/10 2/10 6    VOR - Vert. 1/10 1/10 0/10 1/10 3    VMS Test 2/10 2/10 0/10 2/10 6        Outcome Measures:        EDUCATION:   Patient was educated on:  The multiple facets of concussion management including symptom monitoring, vestibulo-ocular, cervicogenic, exertion, and neuro-cognitive function  Pathophysiology and mechanism of injury for a concussion   The importance of balance between rest and activity initially after injury including appropriate usage of 'expose & recover' and 'Goldilocks' principles (not too much, not too little)  The Return to Function/Work protocols  Symptom control and management including minimizing potentially irritable stimuli (i.e. screens, crowded/noisy areas, driving, etc.)    Questions answered to patient & family's satisfaction & patient verbalized understanding & agreement with POC.       Goals: Set and discussed today  Active       Concussion       STGs (Progressing)       Start:  24    Expected End:  25       1. Complete oculomotor and vestibular assessments with minimal symptoms and w/o the presence of abnormal eye movements  2. Decrease symptom score to 15 on PCSS to progress towards LTGs.  3. Demonstrate independence with home exercise program  4. Tolerate increased exercise without adverse reaction  5. Demonstrate improved posture & proper body mechanics throughout session         LTGs (Progressing)       Start:  24    Expected End:  25       1. Progress through the Return to Function/Work protocols without increased symptoms  2. Complete oculomotor and vestibular assessments without increased symptoms or the presence of abnormal eye  "movements  3. Decrease symptom score to 0 on PCSS  Increase gross ROM to pain free + at least 65 degs flexion, 45 degs extension, 65 degs side bend B, 90 degs R B.  4. Increase deep neck flexor endurance strength by at least 30\" to meet the MDC.  5. Report decrease in pain by > 2 points to meet the MCID  6. Decrease score of NDI by > 5 points to meet the MCID                Plan of care was developed with input and agreement by the patient      Treatment Performed:    Therapeutic Exercise:    20 min  Red TB cervical retraction in seated 2x10  First rib mobility w/ towel and 3-4\" holds 2x10  Self SNAGs rotation w/ 3-4\" holds x10 L*  Thoracic extensions over roller in chain w/ 3-4\" holds 2x10  Doorway pec stretch x1'  Cat/cows in quadruped w/ 3-4\" holds x10*    * = added to HEP.  Sheet with exercise descriptions and images issued.  Skilled intervention utilized in the appropriate selection & application of above exercises.  Verbal and tactile cues provided for proper form and technique.  Pt. demonstrated appropriate form & verbalized understanding of optimal technique for above exercises.    [email or web address]  Access Code: [email or web address]      Manual Therapy:     23 min  Suboccipital release  UT release B  C0-C7 B TP PA glides grade 2-3  STM/Trp R upper trap  Lower cervical up-glides B grade 2-3  Cervical side glides B grade 2-3  Upper thoracic pistol manipulation      Neuromuscular Re-education:   min      Other:      min         PT Therapeutic Procedures Time Entry  Manual Therapy Time Entry: 23  Therapeutic Exercise Time Entry: 20                      Assessment:  Patient tolerated today's session w/ a reported reduction in overall tightness and discomfort. HA remained unchanged. Today's session was focused more on mobility and soft tissue flexibility due to patient's reports of a recent increase in RUE numbness. Patient is reporting progress and will benefit from ongoing PT services to continue " to progress cervical and thoracic mobility, as well as VOR, balance and strengthening activities as tolerated to reach established goals to return to PLOF.         Plan:  Reassess and if improved continue POC for today's visit. If still highly symptomatic in RUE, focus on thoracic mobility and pec flexibility. Trial of reactionary blaze pod LE hits/reaches, standing T raises. Progress VOR speed as tolerated and continue manual prn. Recheck DNF endurance.    Sonido Dwyer, PT, ATC

## 2025-02-04 ENCOUNTER — TREATMENT (OUTPATIENT)
Dept: PHYSICAL THERAPY | Facility: CLINIC | Age: 60
End: 2025-02-04
Payer: COMMERCIAL

## 2025-02-04 DIAGNOSIS — S06.0X0D CONCUSSION WITH NO LOSS OF CONSCIOUSNESS, SUBSEQUENT ENCOUNTER: Primary | ICD-10-CM

## 2025-02-04 PROCEDURE — 97140 MANUAL THERAPY 1/> REGIONS: CPT | Mod: GP

## 2025-02-04 PROCEDURE — 97110 THERAPEUTIC EXERCISES: CPT | Mod: GP

## 2025-02-04 ASSESSMENT — PAIN SCALES - GENERAL: PAINLEVEL_OUTOF10: 0 - NO PAIN

## 2025-02-04 ASSESSMENT — PAIN - FUNCTIONAL ASSESSMENT: PAIN_FUNCTIONAL_ASSESSMENT: 0-10

## 2025-02-04 NOTE — PROGRESS NOTES
"  Physical Therapy  Physical Therapy Concussion Treatment    Patient Name: Jessica Jordan  MRN: [id number]  Today's Date: 2/4/2025  Time Calculation  Start Time: 0700  Stop Time: 0745  Time Calculation (min): 45 min    Insurance:  Visit number: 7 of 18 PT/OT  Authorization info: auth needed  Insurance Type:  employee    General:  Reason for visit: Concussion w/o LOC  Referred by: Dr. Lawton    Precautions: anxiety and depression PMH, has been on medications for awhile  Precautions  Precautions Comment: anxiety, depression    Medical History Form: Reviewed (scanned into chart)    Subjective:   Patient reports she is feeling much better than last week. No headaches or symptoms coming in to session. R arm is still tingling and numb.     Pain:  Pain Assessment: 0-10  0-10 (Numeric) Pain Score: 0 - No pain    Objective:  Objective     Posture: rounded shoulders, increased thoracic kyphosis       Palpation: Trp R thoracic paraspinals, TTP cervical SP's and paraspinals.        Joint Mobility: hypomobile cervical side glides B, produces pain R      Observation: Appears more energetic compared to previous sessions      Dermatomes/Myotomes  Deferred due to recent MD visit  C4:   C5:   C6:   C7:   C8:   T1:       Reflexes  Deferred    (R)  (L)  C5:      C6:      C7:            ROM    Cervical AROM (Degrees) updated 1/21    Flexion: 45*  Extension: 50*  (L) Rotation: 50  (R) Rotation: 85  (L) Side Bend: 35*  (R) Side Bend: 45  *end range symptom production      Special Tests     Spurling's: Neg  ULTT: Pos median and radial, radial worse   Sharp Skyla: pos due to symptom production no clunk or laxity felt  Alar Ligament: neg  Flexion-Rotation: pos R  DNF Endurance: 3 sec   4 stage balance: Feet together eyes open: 20\", feet semi-tandem eyes open: 20\". Tandem stance eyes open: 15\" + increased dizziness, single leg eyes open 10\" LLE, 5\" RLE.        Vestibular/Ocular Motor Screen (VOMS) updated 1/21     HA Dizziness Nausea " Fogginess Total Comments   Baseline 0/10 0/10 0/10 0/10 0    Smooth Pursuits 1/10 1/10 0/10 1/10 3    Saccades - Hor. 1/10 1/10 0/10 1/10 3    Saccades - Vert. 1/10 1/10 0/10 1/10 3    Convergence 1/10 1/10 0/10 1/10 3 Av cm   VOR - Hor. 2/10 2/10 0/10 2/10 6    VOR - Vert. 1/10 1/10 0/10 1/10 3    VMS Test 2/10 2/10 0/10 2/10 6        Outcome Measures:        EDUCATION:   Patient was educated on:  The multiple facets of concussion management including symptom monitoring, vestibulo-ocular, cervicogenic, exertion, and neuro-cognitive function  Pathophysiology and mechanism of injury for a concussion   The importance of balance between rest and activity initially after injury including appropriate usage of 'expose & recover' and 'Goldilocks' principles (not too much, not too little)  The Return to Function/Work protocols  Symptom control and management including minimizing potentially irritable stimuli (i.e. screens, crowded/noisy areas, driving, etc.)    Questions answered to patient & family's satisfaction & patient verbalized understanding & agreement with POC.       Goals: Set and discussed today  Active       Concussion       STGs (Progressing)       Start:  24    Expected End:  25       1. Complete oculomotor and vestibular assessments with minimal symptoms and w/o the presence of abnormal eye movements  2. Decrease symptom score to 15 on PCSS to progress towards LTGs.  3. Demonstrate independence with home exercise program  4. Tolerate increased exercise without adverse reaction  5. Demonstrate improved posture & proper body mechanics throughout session         LTGs (Progressing)       Start:  24    Expected End:  25       1. Progress through the Return to Function/Work protocols without increased symptoms  2. Complete oculomotor and vestibular assessments without increased symptoms or the presence of abnormal eye movements  3. Decrease symptom score to 0 on PCSS  Increase gross  "ROM to pain free + at least 65 degs flexion, 45 degs extension, 65 degs side bend B, 90 degs R B.  4. Increase deep neck flexor endurance strength by at least 30\" to meet the MDC.  5. Report decrease in pain by > 2 points to meet the MCID  6. Decrease score of NDI by > 5 points to meet the MCID                Plan of care was developed with input and agreement by the patient      Treatment Performed:    Therapeutic Exercise:    30 min  Recumbent bike seat 6 x5'  Thread the needles w/ roller   Pec minor release on 1/2 roller   VOR x1 horizontal w/ walking 3x50'  VOR x2 vertical w/ walking 3x50'  Yellow stroops TB T raises 2x12  2# DB scaption 2x12*  Median nerve glides 2x20*    * = added to HEP.  Sheet with exercise descriptions and images issued.  Skilled intervention utilized in the appropriate selection & application of above exercises.  Verbal and tactile cues provided for proper form and technique.  Pt. demonstrated appropriate form & verbalized understanding of optimal technique for above exercises.    [email or web address]  Access Code: [email or web address]      Manual Therapy:     15 min  Suboccipital release  UT release R  Scalene release R  C0-C7 B TP PA glides grade 2-3  STM/Trp R upper trap  Cervical side glides B grade 2-3        Neuromuscular Re-education:   min      Other:      min         PT Therapeutic Procedures Time Entry  Manual Therapy Time Entry: 15  Therapeutic Exercise Time Entry: 30                      Assessment:  Patient tolerated today's session w/ expected muscle fatigue. Demonstrates improvements in activity tolerance through progression of VOR to include walking dual tasking. Had mild symptom production during VOR which was able to resolve w/ short rest. Continues to have difficulty w/ UE paresthesia during ROM activities, likely due to median nerve irritation. Patient will benefit from ongoing PT services to continue to progress dual tasking, VOR and nerve glides as " tolerated to reach established goals to return to PLOF.           Plan:  Recheck DNF endurance. Progress VOR as tolerated, continue nerve glides as tolerated. Trial of mechanical traction.     Sonido Dwyer, PT, ATC

## 2025-02-11 ENCOUNTER — TREATMENT (OUTPATIENT)
Dept: PHYSICAL THERAPY | Facility: CLINIC | Age: 60
End: 2025-02-11
Payer: COMMERCIAL

## 2025-02-11 DIAGNOSIS — S06.0X0D CONCUSSION WITH NO LOSS OF CONSCIOUSNESS, SUBSEQUENT ENCOUNTER: Primary | ICD-10-CM

## 2025-02-11 PROCEDURE — 97140 MANUAL THERAPY 1/> REGIONS: CPT | Mod: GP

## 2025-02-11 PROCEDURE — 97110 THERAPEUTIC EXERCISES: CPT | Mod: GP

## 2025-02-11 PROCEDURE — 97012 MECHANICAL TRACTION THERAPY: CPT | Mod: GP

## 2025-02-11 ASSESSMENT — PAIN SCALES - GENERAL: PAINLEVEL_OUTOF10: 0 - NO PAIN

## 2025-02-11 ASSESSMENT — PAIN - FUNCTIONAL ASSESSMENT: PAIN_FUNCTIONAL_ASSESSMENT: 0-10

## 2025-02-11 NOTE — PROGRESS NOTES
"  Physical Therapy  Physical Therapy Concussion Treatment    Patient Name: Jessica Jordan  MRN: [id number]  Today's Date: 2/11/2025  Time Calculation  Start Time: 0700  Stop Time: 0740  Time Calculation (min): 40 min    Insurance:  Visit number: 8 of 18 PT/OT  Authorization info: auth needed  Insurance Type:  employee    General:  Reason for visit: Concussion w/o LOC  Referred by: Dr. Lawton    Precautions: anxiety and depression PMH, has been on medications for awhile  Precautions  Precautions Comment: anxiety, depression    Medical History Form: Reviewed (scanned into chart)    Subjective:   Patient reports she is feeling ok today. Has returned to work for 1/2 days. Feels very tired at the end of her shifts. Headaches and dizziness are continuing to decrease in frequency and intensity. Arm pain is worsening, feels her hand is numb w/ most ADLs.     Pain:  Pain Assessment: 0-10  0-10 (Numeric) Pain Score: 0 - No pain    Objective:  Objective     Posture: rounded shoulders, increased thoracic kyphosis       Palpation: Trp R thoracic paraspinals, TTP cervical SP's and paraspinals.        Joint Mobility: hypomobile cervical side glides B, produces pain R      Observation: Appears more energetic compared to previous sessions      Dermatomes/Myotomes  Deferred due to recent MD visit  C4:   C5:   C6:   C7:   C8:   T1:       Reflexes  Deferred    (R)  (L)  C5:      C6:      C7:            ROM    Cervical AROM (Degrees) updated 1/21    Flexion: 45*  Extension: 50*  (L) Rotation: 50  (R) Rotation: 85  (L) Side Bend: 35*  (R) Side Bend: 45  *end range symptom production      Special Tests     Spurling's: Neg  ULTT: Pos median and radial, radial worse   Sharp Skyla: pos due to symptom production no clunk or laxity felt  Alar Ligament: neg  Flexion-Rotation: pos R  DNF Endurance: 3 sec   4 stage balance: Feet together eyes open: 20\", feet semi-tandem eyes open: 20\". Tandem stance eyes open: 15\" + increased dizziness, single " "leg eyes open 10\" LLE, 5\" RLE.        Vestibular/Ocular Motor Screen (VOMS) updated      HA Dizziness Nausea Fogginess Total Comments   Baseline 0/10 0/10 0/10 0/10 0    Smooth Pursuits 1/10 1/10 0/10 1/10 3    Saccades - Hor. 1/10 1/10 0/10 1/10 3    Saccades - Vert. 1/10 1/10 0/10 1/10 3    Convergence 1/10 1/10 0/10 1/10 3 Av cm   VOR - Hor. 2/10 2/10 0/10 2/10 6    VOR - Vert. 1/10 1/10 0/10 1/10 3    VMS Test 2/10 2/10 0/10 2/10 6        Outcome Measures:        EDUCATION:   Patient was educated on:  The multiple facets of concussion management including symptom monitoring, vestibulo-ocular, cervicogenic, exertion, and neuro-cognitive function  Pathophysiology and mechanism of injury for a concussion   The importance of balance between rest and activity initially after injury including appropriate usage of 'expose & recover' and 'Goldilocks' principles (not too much, not too little)  The Return to Function/Work protocols  Symptom control and management including minimizing potentially irritable stimuli (i.e. screens, crowded/noisy areas, driving, etc.)    Questions answered to patient & family's satisfaction & patient verbalized understanding & agreement with POC.       Goals: Set and discussed today  Active       Concussion       STGs (Progressing)       Start:  24    Expected End:  25       1. Complete oculomotor and vestibular assessments with minimal symptoms and w/o the presence of abnormal eye movements  2. Decrease symptom score to 15 on PCSS to progress towards LTGs.  3. Demonstrate independence with home exercise program  4. Tolerate increased exercise without adverse reaction  5. Demonstrate improved posture & proper body mechanics throughout session         LTGs (Progressing)       Start:  24    Expected End:  25       1. Progress through the Return to Function/Work protocols without increased symptoms  2. Complete oculomotor and vestibular assessments without " "increased symptoms or the presence of abnormal eye movements  3. Decrease symptom score to 0 on PCSS  Increase gross ROM to pain free + at least 65 degs flexion, 45 degs extension, 65 degs side bend B, 90 degs R B.  4. Increase deep neck flexor endurance strength by at least 30\" to meet the MDC.  5. Report decrease in pain by > 2 points to meet the MCID  6. Decrease score of NDI by > 5 points to meet the MCID                Plan of care was developed with input and agreement by the patient      Treatment Performed:    Therapeutic Exercise:    10 min  Cat/cow w/ 2-3\" holds per position x10  Radial nerve glides 2x15*  Increased time required for nerve glides for technique purposes    * = added to HEP.  Sheet with exercise descriptions and images issued.  Skilled intervention utilized in the appropriate selection & application of above exercises.  Verbal and tactile cues provided for proper form and technique.  Pt. demonstrated appropriate form & verbalized understanding of optimal technique for above exercises.    [email or web address]  Access Code: [email or web address]      Manual Therapy:     15 min  Suboccipital release  Scalene STM  Pec minor release   C0-C7 B TP PA glides grade 2-3  Cervical side glides B grade 2-3      Neuromuscular Re-education:   min      Other: Mechanical traction  15 min  Max 25#, min 20# 2 steps x10'  Time included for set-up       PT Therapeutic Procedures Time Entry  Manual Therapy Time Entry: 15  Therapeutic Exercise Time Entry: 10  PT Modalities Time Entry  Mechanical Traction Time Entry: 15                   Assessment:  Patient tolerated today's session w/ a reported reduction in arm symptoms following traction and nerve glides. Demonstrates improvements in activity tolerance through reported return to work w/o an aggravation of symptoms. Had some difficulty w/ cat cows due to hand symptoms, which resolved w/ nerve glides. Patient will benefit from ongoing PT services to " continue to progress VOR, nerve glides and continued traction as tolerated to reach established goals to return to PLOF.         Plan:  Assess response to mechanical traction, radial nerve glides and review MD check in. Continue manual and traction if warranted. Progress VOR as tolerated.     Sonido Dwyer, PT, ATC
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
## 2025-02-14 ENCOUNTER — OFFICE VISIT (OUTPATIENT)
Dept: ORTHOPEDIC SURGERY | Facility: CLINIC | Age: 60
End: 2025-02-14
Payer: COMMERCIAL

## 2025-02-14 VITALS
DIASTOLIC BLOOD PRESSURE: 79 MMHG | HEART RATE: 82 BPM | TEMPERATURE: 97.5 F | WEIGHT: 139.55 LBS | BODY MASS INDEX: 26.35 KG/M2 | HEIGHT: 61 IN | SYSTOLIC BLOOD PRESSURE: 130 MMHG

## 2025-02-14 DIAGNOSIS — M99.02 SOMATIC DYSFUNCTION OF THORACIC REGION: ICD-10-CM

## 2025-02-14 DIAGNOSIS — M99.07 SOMATIC DYSFUNCTION OF UPPER EXTREMITY: ICD-10-CM

## 2025-02-14 DIAGNOSIS — M99.00 SOMATIC DYSFUNCTION OF HEAD REGION: ICD-10-CM

## 2025-02-14 DIAGNOSIS — S46.811A TRAPEZIUS STRAIN, RIGHT, INITIAL ENCOUNTER: ICD-10-CM

## 2025-02-14 DIAGNOSIS — M99.08 SOMATIC DYSFUNCTION OF RIB CAGE REGION: ICD-10-CM

## 2025-02-14 DIAGNOSIS — M99.01 SOMATIC DYSFUNCTION OF CERVICAL REGION: ICD-10-CM

## 2025-02-14 DIAGNOSIS — M47.812 CERVICAL SPONDYLOSIS: Primary | ICD-10-CM

## 2025-02-14 PROCEDURE — 98927 OSTEOPATH MANJ 5-6 REGIONS: CPT | Performed by: PEDIATRICS

## 2025-02-14 PROCEDURE — 99214 OFFICE O/P EST MOD 30 MIN: CPT | Performed by: PEDIATRICS

## 2025-02-14 PROCEDURE — 99214 OFFICE O/P EST MOD 30 MIN: CPT | Mod: 25 | Performed by: PEDIATRICS

## 2025-02-14 PROCEDURE — 1036F TOBACCO NON-USER: CPT | Performed by: PEDIATRICS

## 2025-02-14 PROCEDURE — 3008F BODY MASS INDEX DOCD: CPT | Performed by: PEDIATRICS

## 2025-02-14 ASSESSMENT — PAIN SCALES - GENERAL: PAINLEVEL_OUTOF10: 0-NO PAIN

## 2025-02-17 ENCOUNTER — TREATMENT (OUTPATIENT)
Dept: PHYSICAL THERAPY | Facility: CLINIC | Age: 60
End: 2025-02-17
Payer: COMMERCIAL

## 2025-02-17 DIAGNOSIS — S06.0X0D CONCUSSION WITH NO LOSS OF CONSCIOUSNESS, SUBSEQUENT ENCOUNTER: Primary | ICD-10-CM

## 2025-02-17 PROCEDURE — 97012 MECHANICAL TRACTION THERAPY: CPT | Mod: GP

## 2025-02-17 PROCEDURE — 97140 MANUAL THERAPY 1/> REGIONS: CPT | Mod: GP

## 2025-02-17 ASSESSMENT — PAIN - FUNCTIONAL ASSESSMENT: PAIN_FUNCTIONAL_ASSESSMENT: 0-10

## 2025-02-17 ASSESSMENT — PAIN SCALES - GENERAL: PAINLEVEL_OUTOF10: 0 - NO PAIN

## 2025-02-17 NOTE — PROGRESS NOTES
"  Physical Therapy  Physical Therapy Concussion Treatment    Patient Name: Jessica Jordan  MRN: [id number]  Today's Date: 2/17/2025  Time Calculation  Start Time: 1730  Stop Time: 1810  Time Calculation (min): 40 min    Insurance:  Visit number: 9 of 18 PT/OT  Authorization info: auth needed  Insurance Type:  employee    General:  Reason for visit: Concussion w/o LOC  Referred by: Dr. Lawton    Precautions: anxiety and depression PMH, has been on medications for awhile  Precautions  Precautions Comment: anxiety, depression    Medical History Form: Reviewed (scanned into chart)    Subjective:   Patient reports she is doing ok today. HA's and dizziness are continuing to be less frequent. Hand paresthesia was better after traction and treatment on Friday from sports med fellow. Hand symptoms are flared up today after work and she feels tired, worked a full day today for first time.     Pain:  Pain Assessment: 0-10  0-10 (Numeric) Pain Score: 0 - No pain    Objective:  Objective     Posture: rounded shoulders, increased thoracic kyphosis       Palpation: Trp R thoracic paraspinals, TTP cervical SP's and paraspinals.        Joint Mobility: hypomobile cervical side glides B, produces pain R      Observation: Appears more energetic compared to previous sessions      Dermatomes/Myotomes  Deferred due to recent MD visit  C4:   C5:   C6:   C7:   C8:   T1:       Reflexes  Deferred    (R)  (L)  C5:      C6:      C7:            ROM    Cervical AROM (Degrees) updated 1/21    Flexion: 45*  Extension: 50*  (L) Rotation: 50  (R) Rotation: 85  (L) Side Bend: 35*  (R) Side Bend: 45  *end range symptom production      Special Tests     Spurling's: Neg  ULTT: Pos median and radial, radial worse   Sharp Skyla: pos due to symptom production no clunk or laxity felt  Alar Ligament: neg  Flexion-Rotation: pos R  DNF Endurance: 3 sec   4 stage balance: Feet together eyes open: 20\", feet semi-tandem eyes open: 20\". Tandem stance eyes open: " "15\" + increased dizziness, single leg eyes open 10\" LLE, 5\" RLE.        Vestibular/Ocular Motor Screen (VOMS) updated      HA Dizziness Nausea Fogginess Total Comments   Baseline 0/10 0/10 0/10 0/10 0    Smooth Pursuits 1/10 1/10 0/10 1/10 3    Saccades - Hor. 1/10 1/10 0/10 1/10 3    Saccades - Vert. 1/10 1/10 0/10 1/10 3    Convergence 1/10 1/10 0/10 1/10 3 Av cm   VOR - Hor. 2/10 2/10 0/10 2/10 6    VOR - Vert. 1/10 1/10 0/10 1/10 3    VMS Test 2/10 2/10 0/10 2/10 6        Outcome Measures:        EDUCATION:   Patient was educated on:  The multiple facets of concussion management including symptom monitoring, vestibulo-ocular, cervicogenic, exertion, and neuro-cognitive function  Pathophysiology and mechanism of injury for a concussion   The importance of balance between rest and activity initially after injury including appropriate usage of 'expose & recover' and 'Goldilocks' principles (not too much, not too little)  The Return to Function/Work protocols  Symptom control and management including minimizing potentially irritable stimuli (i.e. screens, crowded/noisy areas, driving, etc.)    Questions answered to patient & family's satisfaction & patient verbalized understanding & agreement with POC.       Goals: Set and discussed today  Active       Concussion       STGs (Progressing)       Start:  24    Expected End:  25       1. Complete oculomotor and vestibular assessments with minimal symptoms and w/o the presence of abnormal eye movements  2. Decrease symptom score to 15 on PCSS to progress towards LTGs.  3. Demonstrate independence with home exercise program  4. Tolerate increased exercise without adverse reaction  5. Demonstrate improved posture & proper body mechanics throughout session         LTGs (Progressing)       Start:  24    Expected End:  25       1. Progress through the Return to Function/Work protocols without increased symptoms  2. Complete oculomotor and " "vestibular assessments without increased symptoms or the presence of abnormal eye movements  3. Decrease symptom score to 0 on PCSS  Increase gross ROM to pain free + at least 65 degs flexion, 45 degs extension, 65 degs side bend B, 90 degs R B.  4. Increase deep neck flexor endurance strength by at least 30\" to meet the MDC.  5. Report decrease in pain by > 2 points to meet the MCID  6. Decrease score of NDI by > 5 points to meet the MCID                Plan of care was developed with input and agreement by the patient      Treatment Performed:    Therapeutic Exercise:    0 min      * = added to HEP.  Sheet with exercise descriptions and images issued.  Skilled intervention utilized in the appropriate selection & application of above exercises.  Verbal and tactile cues provided for proper form and technique.  Pt. demonstrated appropriate form & verbalized understanding of optimal technique for above exercises.    [email or web address]  Access Code: [email or web address]      Manual Therapy:     25 min  Suboccipital release  C0-C7 B TP PA glides grade 2-3  Cervical side glides B grade 2-3  B UT release  Prone CTJ manipulation B  Supine mid thoracic pistol manipulation  Seated thoracic genie mobilizations  IASTM R UT and posterior cuff  Kneeling lat stretch w/ dowel, poor tolerance, discontinued  Banded black perform better TB lat stretch 3x1'    Neuromuscular Re-education:   min      Other: Mechanical traction  15 min  Max 25#, min 20# 2 steps x10'  Time included for set-up       PT Therapeutic Procedures Time Entry  Manual Therapy Time Entry: 25  PT Modalities Time Entry  Mechanical Traction Time Entry: 15                   Assessment:  Patient tolerated today's session w/ a reported reduction in overall stiffness and soreness. Demonstrates improvements in activity tolerance displayed through completion of full session w/ only 1 instance of hand symptom production. Patient is continuing to progress " slowly, Upper limb tension symptoms are main remaining factors limiting ADLs. Concussion symptoms have mostly resolved. She will benefit from ongoing PT services to continue to progress cervical and thoracic mobility, as well as scapular and cervical strengthening as tolerated to reach established goals to return to PLOF.       Plan:  Continue traction and manual work prn. Practice/review radial nerve glides, resisted cervical retraction and standing open books.     Sonido Dwyer, PT, ATC

## 2025-02-25 ENCOUNTER — APPOINTMENT (OUTPATIENT)
Dept: PHYSICAL THERAPY | Facility: CLINIC | Age: 60
End: 2025-02-25
Payer: COMMERCIAL

## 2025-02-25 DIAGNOSIS — S06.0X0D CONCUSSION WITH NO LOSS OF CONSCIOUSNESS, SUBSEQUENT ENCOUNTER: Primary | ICD-10-CM
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
## 2025-03-07 ENCOUNTER — TREATMENT (OUTPATIENT)
Dept: PHYSICAL THERAPY | Facility: CLINIC | Age: 60
End: 2025-03-07
Payer: COMMERCIAL

## 2025-03-07 ENCOUNTER — OFFICE VISIT (OUTPATIENT)
Dept: ORTHOPEDIC SURGERY | Facility: CLINIC | Age: 60
End: 2025-03-07
Payer: COMMERCIAL

## 2025-03-07 VITALS
HEART RATE: 73 BPM | HEIGHT: 61 IN | SYSTOLIC BLOOD PRESSURE: 145 MMHG | WEIGHT: 141.09 LBS | BODY MASS INDEX: 26.64 KG/M2 | DIASTOLIC BLOOD PRESSURE: 84 MMHG

## 2025-03-07 DIAGNOSIS — G54.0 THORACIC OUTLET SYNDROME: ICD-10-CM

## 2025-03-07 DIAGNOSIS — F41.9 ANXIETY: ICD-10-CM

## 2025-03-07 DIAGNOSIS — S06.0X0D CONCUSSION WITHOUT LOSS OF CONSCIOUSNESS, SUBSEQUENT ENCOUNTER: ICD-10-CM

## 2025-03-07 DIAGNOSIS — R20.0 RIGHT UPPER EXTREMITY NUMBNESS: ICD-10-CM

## 2025-03-07 DIAGNOSIS — S06.0X0D CONCUSSION WITH NO LOSS OF CONSCIOUSNESS, SUBSEQUENT ENCOUNTER: Primary | ICD-10-CM

## 2025-03-07 DIAGNOSIS — M47.812 CERVICAL SPONDYLOSIS: Primary | ICD-10-CM

## 2025-03-07 DIAGNOSIS — S46.811A TRAPEZIUS STRAIN, RIGHT, INITIAL ENCOUNTER: ICD-10-CM

## 2025-03-07 PROCEDURE — 1036F TOBACCO NON-USER: CPT | Performed by: PEDIATRICS

## 2025-03-07 PROCEDURE — 97012 MECHANICAL TRACTION THERAPY: CPT | Mod: GP

## 2025-03-07 PROCEDURE — 3008F BODY MASS INDEX DOCD: CPT | Performed by: PEDIATRICS

## 2025-03-07 PROCEDURE — 99214 OFFICE O/P EST MOD 30 MIN: CPT | Performed by: PEDIATRICS

## 2025-03-07 PROCEDURE — 97110 THERAPEUTIC EXERCISES: CPT | Mod: GP

## 2025-03-07 PROCEDURE — 97140 MANUAL THERAPY 1/> REGIONS: CPT | Mod: GP

## 2025-03-07 ASSESSMENT — ENCOUNTER SYMPTOMS
DEPRESSION: 0
LOSS OF SENSATION IN FEET: 0
OCCASIONAL FEELINGS OF UNSTEADINESS: 1

## 2025-03-07 ASSESSMENT — PAIN SCALES - GENERAL
PAINLEVEL_OUTOF10: 3
PAINLEVEL_OUTOF10: 3

## 2025-03-07 ASSESSMENT — PAIN - FUNCTIONAL ASSESSMENT: PAIN_FUNCTIONAL_ASSESSMENT: 0-10

## 2025-03-07 NOTE — LETTER
March 7, 2025     Patient: Jessica Jordan   YOB: 1965   Date of Visit: 3/7/2025       To Whom It May Concern:    It is my medical opinion that Jessica Jordan should reduce work hours to accommodate for symptoms of concussion.  Please allow work from home and use of PTO/short term disability to accommodate for symptoms of concussion.  Futher information will be provided and forms completed as needed. Additional work up and referrals are in process.  Please allow time off to attend physical therapy and additional medical appointments.     If you have any questions or concerns, please don't hesitate to call.         Sincerely,        Tyra Lawton,     CC: No Recipients

## 2025-03-07 NOTE — PROGRESS NOTES
"  Physical Therapy  Physical Therapy Concussion Treatment    Patient Name: Jessica Jordan  MRN: [id number]  Today's Date: 3/7/2025  Time Calculation  Start Time: 0745  Stop Time: 0830  Time Calculation (min): 45 min    Insurance:  Visit number: 10 of 18 PT/OT  Authorization info: auth needed  Insurance Type:  employee    General:  Reason for visit: Concussion w/o LOC  Referred by: Dr. Lawton    Precautions: anxiety and depression PMH, has been on medications for awhile  Precautions  Precautions Comment: anxiety, depression    Medical History Form: Reviewed (scanned into chart)    Subjective:   Patient reports she is feeling ok today. Had a flare up last week where she was very dizzy and lightheaded. Has a check in after visit today w/ MD to address recent increase in symptoms. Feels hand symptom frequency has decreased but intensity has remained consistent.     Pain:  Pain Assessment: 0-10  0-10 (Numeric) Pain Score: 3    Objective:  Objective     Posture: rounded shoulders, increased thoracic kyphosis       Palpation: Trp R thoracic paraspinals, TTP cervical SP's and paraspinals.        Joint Mobility: hypomobile cervical side glides B, produces pain R      Observation: Appears more energetic compared to previous sessions      Dermatomes/Myotomes  Deferred due to recent MD visit  C4:   C5:   C6:   C7:   C8:   T1:       Reflexes  Deferred    (R)  (L)  C5:      C6:      C7:            ROM    Cervical AROM (Degrees) updated 1/21    Flexion: 45*  Extension: 50*  (L) Rotation: 50  (R) Rotation: 85  (L) Side Bend: 35*  (R) Side Bend: 45  *end range symptom production      Special Tests     Spurling's: Neg  ULTT: Pos median and radial, radial worse   Sharp Skyla: pos due to symptom production no clunk or laxity felt  Alar Ligament: neg  Flexion-Rotation: pos R  DNF Endurance: 3 sec   4 stage balance: Feet together eyes open: 20\", feet semi-tandem eyes open: 20\". Tandem stance eyes open: 15\" + increased dizziness, single " "leg eyes open 10\" LLE, 5\" RLE.        Vestibular/Ocular Motor Screen (VOMS) updated      HA Dizziness Nausea Fogginess Total Comments   Baseline 0/10 0/10 0/10 0/10 0    Smooth Pursuits 1/10 1/10 0/10 1/10 3    Saccades - Hor. 1/10 1/10 0/10 1/10 3    Saccades - Vert. 1/10 1/10 0/10 1/10 3    Convergence 1/10 1/10 0/10 1/10 3 Av cm   VOR - Hor. 2/10 2/10 0/10 2/10 6    VOR - Vert. 1/10 1/10 0/10 1/10 3    VMS Test 2/10 2/10 0/10 2/10 6        Outcome Measures:        EDUCATION:   Patient was educated on:  The multiple facets of concussion management including symptom monitoring, vestibulo-ocular, cervicogenic, exertion, and neuro-cognitive function  Pathophysiology and mechanism of injury for a concussion   The importance of balance between rest and activity initially after injury including appropriate usage of 'expose & recover' and 'Goldilocks' principles (not too much, not too little)  The Return to Function/Work protocols  Symptom control and management including minimizing potentially irritable stimuli (i.e. screens, crowded/noisy areas, driving, etc.)    Questions answered to patient & family's satisfaction & patient verbalized understanding & agreement with POC.       Goals: Set and discussed today  Active       Concussion       STGs (Progressing)       Start:  24    Expected End:  25       1. Complete oculomotor and vestibular assessments with minimal symptoms and w/o the presence of abnormal eye movements  2. Decrease symptom score to 15 on PCSS to progress towards LTGs.  3. Demonstrate independence with home exercise program  4. Tolerate increased exercise without adverse reaction  5. Demonstrate improved posture & proper body mechanics throughout session         LTGs (Progressing)       Start:  24    Expected End:  25       1. Progress through the Return to Function/Work protocols without increased symptoms  2. Complete oculomotor and vestibular assessments without " "increased symptoms or the presence of abnormal eye movements  3. Decrease symptom score to 0 on PCSS  Increase gross ROM to pain free + at least 65 degs flexion, 45 degs extension, 65 degs side bend B, 90 degs R B.  4. Increase deep neck flexor endurance strength by at least 30\" to meet the MDC.  5. Report decrease in pain by > 2 points to meet the MCID  6. Decrease score of NDI by > 5 points to meet the MCID                Plan of care was developed with input and agreement by the patient      Treatment Performed:    Therapeutic Exercise:    10 min  Thoracic extensions over roller in chair 2x10 w/ 3-4\" holds   Red TB resisted cervical retraction 2x10*  Standing open book w/ red TB  2x10    * = added to HEP.  Sheet with exercise descriptions and images issued.  Skilled intervention utilized in the appropriate selection & application of above exercises.  Verbal and tactile cues provided for proper form and technique.  Pt. demonstrated appropriate form & verbalized understanding of optimal technique for above exercises.    [email or web address]  Access Code: [email or web address]      Manual Therapy:     25 min  Suboccipital release  C0-C7 B TP PA glides grade 2-3  C0-C7 SP PA glides grade 2-3  C2-C7 upglides grade 2-3 B  Cervical side glides B grade 2-3  B UT release  STM/ Trp R UT and thoracic paraspinals    Neuromuscular Re-education:   min      Other: Mechanical traction  10 min  Max 25#, min 20# 2 steps x8'  Time included for set-up  Discontinued due to HA from strap        PT Therapeutic Procedures Time Entry  Manual Therapy Time Entry: 25  Therapeutic Exercise Time Entry: 10  PT Modalities Time Entry  Mechanical Traction Time Entry: 10                   Assessment:  Patient tolerated today's session w/ a reported reduction in familiar stiffness and soreness. Was able to complete full session of manual and there-ex w/o symptom aggravation. She was slightly irritated during traction from the head strap, " which resolved once it was removed. Patient is progressing slowly despite recent symptom flare up and will benefit from ongoing PT services to continue to progress cervical and thoracic mobility, as well as VOMS and balance exercises as tolerated to reach established goals to return to PLOF.         Plan:  Continue traction and manual work prn. Practice/review radial nerve glides. Review MD check in, VOR exercises and balance training.     Sonido Dwyer, PT, ATC

## 2025-03-12 ENCOUNTER — OFFICE VISIT (OUTPATIENT)
Dept: ORTHOPEDIC SURGERY | Facility: HOSPITAL | Age: 60
End: 2025-03-12
Payer: COMMERCIAL

## 2025-03-12 ENCOUNTER — PHARMACY VISIT (OUTPATIENT)
Dept: PHARMACY | Facility: CLINIC | Age: 60
End: 2025-03-12
Payer: COMMERCIAL

## 2025-03-12 ENCOUNTER — HOSPITAL ENCOUNTER (OUTPATIENT)
Dept: RADIOLOGY | Facility: HOSPITAL | Age: 60
Discharge: HOME | End: 2025-03-12
Payer: COMMERCIAL

## 2025-03-12 VITALS — WEIGHT: 135 LBS | HEIGHT: 61 IN | BODY MASS INDEX: 25.49 KG/M2

## 2025-03-12 DIAGNOSIS — M25.511 RIGHT SHOULDER PAIN, UNSPECIFIED CHRONICITY: ICD-10-CM

## 2025-03-12 DIAGNOSIS — M54.12 CERVICAL RADICULITIS: ICD-10-CM

## 2025-03-12 PROCEDURE — RXMED WILLOW AMBULATORY MEDICATION CHARGE

## 2025-03-12 PROCEDURE — 73030 X-RAY EXAM OF SHOULDER: CPT | Mod: RT

## 2025-03-12 PROCEDURE — 99214 OFFICE O/P EST MOD 30 MIN: CPT | Performed by: ORTHOPAEDIC SURGERY

## 2025-03-12 PROCEDURE — 73030 X-RAY EXAM OF SHOULDER: CPT | Mod: RIGHT SIDE | Performed by: RADIOLOGY

## 2025-03-12 PROCEDURE — 3008F BODY MASS INDEX DOCD: CPT | Performed by: ORTHOPAEDIC SURGERY

## 2025-03-12 RX ORDER — METHYLPREDNISOLONE 4 MG/1
TABLET ORAL
Qty: 21 TABLET | Refills: 0 | Status: SHIPPED | OUTPATIENT
Start: 2025-03-12 | End: 2025-03-18
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
## 2025-03-14 ENCOUNTER — TREATMENT (OUTPATIENT)
Dept: PHYSICAL THERAPY | Facility: CLINIC | Age: 60
End: 2025-03-14
Payer: COMMERCIAL

## 2025-03-14 DIAGNOSIS — S06.0X0D CONCUSSION WITH NO LOSS OF CONSCIOUSNESS, SUBSEQUENT ENCOUNTER: Primary | ICD-10-CM

## 2025-03-14 PROCEDURE — 97140 MANUAL THERAPY 1/> REGIONS: CPT | Mod: GP

## 2025-03-14 PROCEDURE — 97110 THERAPEUTIC EXERCISES: CPT | Mod: GP

## 2025-03-14 ASSESSMENT — PAIN - FUNCTIONAL ASSESSMENT: PAIN_FUNCTIONAL_ASSESSMENT: 0-10

## 2025-03-14 ASSESSMENT — PAIN SCALES - GENERAL: PAINLEVEL_OUTOF10: 0 - NO PAIN

## 2025-03-14 NOTE — PROGRESS NOTES
"  Physical Therapy  Physical Therapy Concussion Treatment    Patient Name: Jessica Jordan  MRN: [id number]  Today's Date: 3/14/2025  Time Calculation  Start Time: 0700  Stop Time: 0745  Time Calculation (min): 45 min    Insurance:  Visit number: 11 of 18 PT/OT  Authorization info: auth needed  Insurance Type:  employee    General:  Reason for visit: Concussion w/o LOC  Referred by: Dr. Lawton    Precautions: anxiety and depression PMH, has been on medications for awhile  Precautions  Precautions Comment: anxiety, depression    Medical History Form: Reviewed (scanned into chart)    Subjective:   Patient reports she is feeling ok this AM. Headaches are improved and less frequent. Arm paresthesia and pain is main limiting ADL factor. Will be following up w/ spine, shoulder ortho r/o RTC.     Pain:  Pain Assessment: 0-10  0-10 (Numeric) Pain Score: 0 - No pain, no headaches or arm paresthesia at start of session.    Objective:  Objective     Posture: rounded shoulders, increased thoracic kyphosis       Palpation: Trp R thoracic paraspinals, TTP cervical SP's and paraspinals.        Joint Mobility: hypomobile cervical side glides B, produces pain R      Observation: Appears more energetic compared to previous sessions      Dermatomes/Myotomes  Deferred due to recent MD visit  C4:   C5:   C6:   C7:   C8:   T1:       Reflexes  Deferred    (R)  (L)  C5:      C6:      C7:            ROM    Cervical AROM (Degrees) updated 1/21    Flexion: 45*  Extension: 50*  (L) Rotation: 50  (R) Rotation: 85  (L) Side Bend: 35*  (R) Side Bend: 45  *end range symptom production      Special Tests     Spurling's: Neg  ULTT: Pos median and radial, radial worse   Sharp Skyla: pos due to symptom production no clunk or laxity felt  Alar Ligament: neg  Flexion-Rotation: pos R  DNF Endurance: 3 sec   4 stage balance: Feet together eyes open: 20\", feet semi-tandem eyes open: 20\". Tandem stance eyes open: 15\" + increased dizziness, single leg eyes " "open 10\" LLE, 5\" RLE.        Vestibular/Ocular Motor Screen (VOMS) updated      HA Dizziness Nausea Fogginess Total Comments   Baseline 0/10 0/10 0/10 0/10 0    Smooth Pursuits 1/10 1/10 0/10 1/10 3    Saccades - Hor. 1/10 1/10 0/10 1/10 3    Saccades - Vert. 1/10 1/10 0/10 1/10 3    Convergence 1/10 1/10 0/10 1/10 3 Av cm   VOR - Hor. 2/10 2/10 0/10 2/10 6    VOR - Vert. 1/10 1/10 0/10 1/10 3    VMS Test 2/10 2/10 0/10 2/10 6        Outcome Measures:        EDUCATION:   Patient was educated on:  The multiple facets of concussion management including symptom monitoring, vestibulo-ocular, cervicogenic, exertion, and neuro-cognitive function  Pathophysiology and mechanism of injury for a concussion   The importance of balance between rest and activity initially after injury including appropriate usage of 'expose & recover' and 'Goldilocks' principles (not too much, not too little)  The Return to Function/Work protocols  Symptom control and management including minimizing potentially irritable stimuli (i.e. screens, crowded/noisy areas, driving, etc.)    Questions answered to patient & family's satisfaction & patient verbalized understanding & agreement with POC.       Goals: Set and discussed today  Active       Concussion       STGs (Progressing)       Start:  24    Expected End:  25       1. Complete oculomotor and vestibular assessments with minimal symptoms and w/o the presence of abnormal eye movements  2. Decrease symptom score to 15 on PCSS to progress towards LTGs.  3. Demonstrate independence with home exercise program  4. Tolerate increased exercise without adverse reaction  5. Demonstrate improved posture & proper body mechanics throughout session         LTGs (Progressing)       Start:  24    Expected End:  25       1. Progress through the Return to Function/Work protocols without increased symptoms  2. Complete oculomotor and vestibular assessments without increased " "symptoms or the presence of abnormal eye movements  3. Decrease symptom score to 0 on PCSS  Increase gross ROM to pain free + at least 65 degs flexion, 45 degs extension, 65 degs side bend B, 90 degs R B.  4. Increase deep neck flexor endurance strength by at least 30\" to meet the MDC.  5. Report decrease in pain by > 2 points to meet the MCID  6. Decrease score of NDI by > 5 points to meet the MCID                Plan of care was developed with input and agreement by the patient      Treatment Performed:    Therapeutic Exercise:    15 min  Supine radial nerve glides w/ PT assist 2x20  Supine medial nerve glides w/ PT assist 2x20  4 way cervical isometrics w/ 4-5\" holds x10 each way*    * = added to HEP.  Sheet with exercise descriptions and images issued.  Skilled intervention utilized in the appropriate selection & application of above exercises.  Verbal and tactile cues provided for proper form and technique.  Pt. demonstrated appropriate form & verbalized understanding of optimal technique for above exercises.    [email or web address]  Access Code: [email or web address]      Manual Therapy:     30 min  Suboccipital release  C0-C7 B TP PA glides grade 2-3  C0-C7 SP PA glides grade 2-3  C2-C7 upglides grade 2-3 B  Cervical side glides B grade 2-3  Prone PA upper-mid thoracic glides grade 3-4  IASTM/ Trp R UT     Neuromuscular Re-education:   min      Other: Mechanical traction  0 min         PT Therapeutic Procedures Time Entry  Manual Therapy Time Entry: 30  Therapeutic Exercise Time Entry: 15                      Assessment:  Patient tolerated today's session w/ mild familiar symptom production during nerve glides and manual therapy which did not limit session. Patient's lingering symptoms are more consistent w/ cervical radiculopathy vs concussion. She appears to have responded well to cervical isometrics and IASTM introduced in today's session. Continues to report no change in symptoms w/ nerve " glides once paresthesia presents. She will benefit from ongoing PT services to continue to progress cervical and thoracic mobility, as well as cervical and scapular strengthening as tolerated to reach established goals to return to PLOF.           Plan:  Continue IASTM. Assess response to isometrics and trial of rotation isometrics if warranted. Trial of rib mobs and wall nerve stretches.     Sonido Dwyer, PT, ATC

## 2025-03-17 ENCOUNTER — HOSPITAL ENCOUNTER (OUTPATIENT)
Dept: RADIOLOGY | Facility: CLINIC | Age: 60
Discharge: HOME | End: 2025-03-17
Payer: COMMERCIAL

## 2025-03-17 ENCOUNTER — OFFICE VISIT (OUTPATIENT)
Dept: ORTHOPEDIC SURGERY | Facility: CLINIC | Age: 60
End: 2025-03-17
Payer: COMMERCIAL

## 2025-03-17 DIAGNOSIS — M54.12 CERVICAL RADICULOPATHY: ICD-10-CM

## 2025-03-17 DIAGNOSIS — M54.12 CERVICAL RADICULOPATHY: Primary | ICD-10-CM

## 2025-03-17 PROCEDURE — 72141 MRI NECK SPINE W/O DYE: CPT | Performed by: RADIOLOGY

## 2025-03-17 PROCEDURE — 72141 MRI NECK SPINE W/O DYE: CPT

## 2025-03-17 PROCEDURE — 99214 OFFICE O/P EST MOD 30 MIN: CPT | Performed by: ORTHOPAEDIC SURGERY
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
## 2025-03-21 ENCOUNTER — APPOINTMENT (OUTPATIENT)
Dept: PHYSICAL THERAPY | Facility: CLINIC | Age: 60
End: 2025-03-21
Payer: COMMERCIAL

## 2025-03-21 DIAGNOSIS — S06.0X0D CONCUSSION WITH NO LOSS OF CONSCIOUSNESS, SUBSEQUENT ENCOUNTER: Primary | ICD-10-CM

## 2025-03-24 ENCOUNTER — OFFICE VISIT (OUTPATIENT)
Dept: ORTHOPEDIC SURGERY | Facility: CLINIC | Age: 60
End: 2025-03-24
Payer: COMMERCIAL

## 2025-03-24 DIAGNOSIS — M54.12 CERVICAL RADICULOPATHY: Primary | ICD-10-CM

## 2025-03-24 PROCEDURE — 99213 OFFICE O/P EST LOW 20 MIN: CPT | Performed by: ORTHOPAEDIC SURGERY

## 2025-03-24 PROCEDURE — 1036F TOBACCO NON-USER: CPT | Performed by: ORTHOPAEDIC SURGERY
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
## 2025-03-28 ENCOUNTER — TREATMENT (OUTPATIENT)
Dept: PHYSICAL THERAPY | Facility: CLINIC | Age: 60
End: 2025-03-28
Payer: COMMERCIAL

## 2025-03-28 ENCOUNTER — APPOINTMENT (OUTPATIENT)
Dept: OTOLARYNGOLOGY | Facility: CLINIC | Age: 60
End: 2025-03-28
Payer: COMMERCIAL

## 2025-03-28 ENCOUNTER — OFFICE VISIT (OUTPATIENT)
Dept: ORTHOPEDIC SURGERY | Facility: CLINIC | Age: 60
End: 2025-03-28
Payer: COMMERCIAL

## 2025-03-28 VITALS
SYSTOLIC BLOOD PRESSURE: 139 MMHG | HEART RATE: 76 BPM | HEIGHT: 60 IN | BODY MASS INDEX: 27.92 KG/M2 | DIASTOLIC BLOOD PRESSURE: 80 MMHG | WEIGHT: 142.2 LBS

## 2025-03-28 DIAGNOSIS — F41.9 ANXIETY: ICD-10-CM

## 2025-03-28 DIAGNOSIS — M99.08 SOMATIC DYSFUNCTION OF RIB CAGE REGION: ICD-10-CM

## 2025-03-28 DIAGNOSIS — M99.00 SOMATIC DYSFUNCTION OF HEAD REGION: ICD-10-CM

## 2025-03-28 DIAGNOSIS — M99.07 SOMATIC DYSFUNCTION OF UPPER EXTREMITY: ICD-10-CM

## 2025-03-28 DIAGNOSIS — M54.12 RADICULOPATHY, CERVICAL: Primary | ICD-10-CM

## 2025-03-28 DIAGNOSIS — S46.811A TRAPEZIUS STRAIN, RIGHT, INITIAL ENCOUNTER: ICD-10-CM

## 2025-03-28 DIAGNOSIS — G54.0 THORACIC OUTLET SYNDROME: ICD-10-CM

## 2025-03-28 DIAGNOSIS — M99.01 SOMATIC DYSFUNCTION OF CERVICAL REGION: ICD-10-CM

## 2025-03-28 DIAGNOSIS — M47.812 CERVICAL SPONDYLOSIS: Primary | ICD-10-CM

## 2025-03-28 DIAGNOSIS — M99.02 SOMATIC DYSFUNCTION OF THORACIC REGION: ICD-10-CM

## 2025-03-28 DIAGNOSIS — M99.03 SOMATIC DYSFUNCTION OF LUMBAR REGION: ICD-10-CM

## 2025-03-28 DIAGNOSIS — S06.0X0D CONCUSSION WITH NO LOSS OF CONSCIOUSNESS, SUBSEQUENT ENCOUNTER: ICD-10-CM

## 2025-03-28 DIAGNOSIS — R20.0 RIGHT UPPER EXTREMITY NUMBNESS: ICD-10-CM

## 2025-03-28 PROCEDURE — 98927 OSTEOPATH MANJ 5-6 REGIONS: CPT | Performed by: PEDIATRICS

## 2025-03-28 PROCEDURE — 97110 THERAPEUTIC EXERCISES: CPT | Mod: GP

## 2025-03-28 PROCEDURE — 97140 MANUAL THERAPY 1/> REGIONS: CPT | Mod: GP

## 2025-03-28 PROCEDURE — 97012 MECHANICAL TRACTION THERAPY: CPT | Mod: GP

## 2025-03-28 PROCEDURE — 99214 OFFICE O/P EST MOD 30 MIN: CPT | Mod: 25 | Performed by: PEDIATRICS

## 2025-03-28 ASSESSMENT — PAIN SCALES - GENERAL
PAINLEVEL_OUTOF10: 4
PAINLEVEL_OUTOF10: 0-NO PAIN

## 2025-03-28 ASSESSMENT — PAIN - FUNCTIONAL ASSESSMENT: PAIN_FUNCTIONAL_ASSESSMENT: 0-10

## 2025-03-28 NOTE — PROGRESS NOTES
"  Physical Therapy  Physical Therapy Concussion Treatment    Patient Name: Jessica Jordan  MRN: [id number]  Today's Date: 3/28/2025  Time Calculation  Start Time: 0745  Stop Time: 0830  Time Calculation (min): 45 min    Insurance:  Visit number: 12 of 18 PT/OT  Authorization info: auth needed  Insurance Type:  employee    General:  Reason for visit: Concussion w/o LOC  Referred by: Dr. Lawton    Precautions: anxiety and depression PMH, has been on medications for awhile  Precautions  Precautions Comment: anxiety, depression    Medical History Form: Reviewed (scanned into chart)    Subjective:   Patient reports she is doing ok overall. Hand paresthesia she feels is getting worse, headaches are more localized to cervical paraspinals. Has been following up w/ spine ortho and will be trying an injection from pain management.     Pain:  Pain Assessment: 0-10  0-10 (Numeric) Pain Score: 4 (paresthesia)    Objective:  Objective     Posture: rounded shoulders, increased thoracic kyphosis       Palpation: Trp R thoracic paraspinals, TTP cervical SP's and paraspinals.        Joint Mobility: hypomobile cervical side glides B, produces pain R      Observation: Appears more energetic compared to previous sessions      Dermatomes/Myotomes  Deferred due to recent MD visit  C4:   C5:   C6:   C7:   C8:   T1:       Reflexes  Deferred    (R)  (L)  C5:      C6:      C7:            ROM    Cervical AROM (Degrees) updated 1/21    Flexion: 45*  Extension: 50*  (L) Rotation: 50  (R) Rotation: 85  (L) Side Bend: 35*  (R) Side Bend: 45  *end range symptom production      Special Tests     Spurling's: Neg  ULTT: Pos median and radial, radial worse   Sharp Skyla: pos due to symptom production no clunk or laxity felt  Alar Ligament: neg  Flexion-Rotation: pos R  DNF Endurance: 3 sec   4 stage balance: Feet together eyes open: 20\", feet semi-tandem eyes open: 20\". Tandem stance eyes open: 15\" + increased dizziness, single leg eyes open 10\" LLE, " "5\" RLE.        Vestibular/Ocular Motor Screen (VOMS) updated      HA Dizziness Nausea Fogginess Total Comments   Baseline 0/10 0/10 0/10 0/10 0    Smooth Pursuits 1/10 1/10 0/10 1/10 3    Saccades - Hor. 1/10 1/10 0/10 1/10 3    Saccades - Vert. 1/10 1/10 0/10 1/10 3    Convergence 1/10 1/10 0/10 1/10 3 Av cm   VOR - Hor. 2/10 2/10 0/10 2/10 6    VOR - Vert. 1/10 1/10 0/10 1/10 3    VMS Test 2/10 2/10 0/10 2/10 6        Outcome Measures:        EDUCATION:   Patient was educated on:  The multiple facets of concussion management including symptom monitoring, vestibulo-ocular, cervicogenic, exertion, and neuro-cognitive function  Pathophysiology and mechanism of injury for a concussion   The importance of balance between rest and activity initially after injury including appropriate usage of 'expose & recover' and 'Goldilocks' principles (not too much, not too little)  The Return to Function/Work protocols  Symptom control and management including minimizing potentially irritable stimuli (i.e. screens, crowded/noisy areas, driving, etc.)    Questions answered to patient & family's satisfaction & patient verbalized understanding & agreement with POC.       Goals: Set and discussed today  Active       Concussion       STGs (Progressing)       Start:  24    Expected End:  25       1. Complete oculomotor and vestibular assessments with minimal symptoms and w/o the presence of abnormal eye movements  2. Decrease symptom score to 15 on PCSS to progress towards LTGs.  3. Demonstrate independence with home exercise program  4. Tolerate increased exercise without adverse reaction  5. Demonstrate improved posture & proper body mechanics throughout session         LTGs (Progressing)       Start:  24    Expected End:  25       1. Progress through the Return to Function/Work protocols without increased symptoms  2. Complete oculomotor and vestibular assessments without increased symptoms or the " "presence of abnormal eye movements  3. Decrease symptom score to 0 on PCSS  Increase gross ROM to pain free + at least 65 degs flexion, 45 degs extension, 65 degs side bend B, 90 degs R B.  4. Increase deep neck flexor endurance strength by at least 30\" to meet the MDC.  5. Report decrease in pain by > 2 points to meet the MCID  6. Decrease score of NDI by > 5 points to meet the MCID                Plan of care was developed with input and agreement by the patient      Treatment Performed:    Therapeutic Exercise:    15 min  Chin tucks w/ 3-4\" holds 3x10*  DB scaption w/ 2# 3x10*  Yellow stroops TB rows 3x10  Yellow stroops TB pulldowns 3x10  Red TB no monies 3x10*    * = added to HEP.  Sheet with exercise descriptions and images issued.  Skilled intervention utilized in the appropriate selection & application of above exercises.  Verbal and tactile cues provided for proper form and technique.  Pt. demonstrated appropriate form & verbalized understanding of optimal technique for above exercises.    [email or web address]  Access Code: [email or web address]      Manual Therapy:     15 min  IASTM/ Trp R UT     Neuromuscular Re-education:   min      Other: Mechanical traction  15 min         PT Therapeutic Procedures Time Entry  Manual Therapy Time Entry: 15  Therapeutic Exercise Time Entry: 15  PT Modalities Time Entry  Mechanical Traction Time Entry: 15                   Assessment:  Patient tolerated today's session w/ expected muscle fatigue and no aggravation of familiar symptoms. Felt IASTM and traction both provided symptom relief. Patient will benefit from ongoing PT services to continue to progress cervical and thoracic mobility, as well as scapular and cervical strengthening as tolerated to reach established goals to return to PLOF.         Plan:  Re-eval for radiculopathy vs TOS. Continue traction and IASTM. Progress cervical isometrics and scap strengthening as tolerated.     Sonido Dwyer, PT, " ATC

## 2025-04-03 ENCOUNTER — EVALUATION (OUTPATIENT)
Dept: PHYSICAL THERAPY | Facility: CLINIC | Age: 60
End: 2025-04-03
Payer: COMMERCIAL

## 2025-04-03 ENCOUNTER — APPOINTMENT (OUTPATIENT)
Dept: OTOLARYNGOLOGY | Facility: CLINIC | Age: 60
End: 2025-04-03
Payer: COMMERCIAL

## 2025-04-03 DIAGNOSIS — M54.12 RADICULOPATHY, CERVICAL: ICD-10-CM

## 2025-04-03 DIAGNOSIS — S06.0X0D CONCUSSION WITH NO LOSS OF CONSCIOUSNESS, SUBSEQUENT ENCOUNTER: ICD-10-CM

## 2025-04-03 DIAGNOSIS — M47.812 CERVICAL SPONDYLOSIS: ICD-10-CM

## 2025-04-03 DIAGNOSIS — G54.0 THORACIC OUTLET SYNDROME: Primary | ICD-10-CM

## 2025-04-03 DIAGNOSIS — S06.0X0D CONCUSSION WITHOUT LOSS OF CONSCIOUSNESS, SUBSEQUENT ENCOUNTER: ICD-10-CM

## 2025-04-03 PROCEDURE — 97140 MANUAL THERAPY 1/> REGIONS: CPT | Mod: GP

## 2025-04-03 PROCEDURE — 97164 PT RE-EVAL EST PLAN CARE: CPT | Mod: GP

## 2025-04-03 ASSESSMENT — PAIN - FUNCTIONAL ASSESSMENT: PAIN_FUNCTIONAL_ASSESSMENT: 0-10

## 2025-04-03 ASSESSMENT — PAIN SCALES - GENERAL: PAINLEVEL_OUTOF10: 0 - NO PAIN
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
## 2025-04-04 DIAGNOSIS — M54.2 NECK PAIN: ICD-10-CM

## 2025-04-04 DIAGNOSIS — S06.0X0A CONCUSSION WITHOUT LOSS OF CONSCIOUSNESS, INITIAL ENCOUNTER: ICD-10-CM

## 2025-04-04 DIAGNOSIS — M54.50 ACUTE MIDLINE LOW BACK PAIN WITHOUT SCIATICA: ICD-10-CM

## 2025-04-04 DIAGNOSIS — F51.01 PRIMARY INSOMNIA: ICD-10-CM

## 2025-04-04 PROCEDURE — RXMED WILLOW AMBULATORY MEDICATION CHARGE

## 2025-04-07 PROCEDURE — RXMED WILLOW AMBULATORY MEDICATION CHARGE

## 2025-04-07 RX ORDER — HYDROXYZINE PAMOATE 50 MG/1
100 CAPSULE ORAL NIGHTLY PRN
Qty: 180 CAPSULE | Refills: 2 | Status: SHIPPED | OUTPATIENT
Start: 2025-04-07 | End: 2026-01-02

## 2025-04-08 ENCOUNTER — PHARMACY VISIT (OUTPATIENT)
Dept: PHARMACY | Facility: CLINIC | Age: 60
End: 2025-04-08
Payer: COMMERCIAL

## 2025-04-08 ENCOUNTER — APPOINTMENT (OUTPATIENT)
Dept: PHYSICAL THERAPY | Facility: CLINIC | Age: 60
End: 2025-04-08
Payer: COMMERCIAL

## 2025-04-08 DIAGNOSIS — S06.0X0D CONCUSSION WITH NO LOSS OF CONSCIOUSNESS, SUBSEQUENT ENCOUNTER: Primary | ICD-10-CM

## 2025-04-08 NOTE — PROGRESS NOTES
Physical Therapy  Physical Therapy Treatment    Patient Name: Jessica Jordan  MRN: [id number]  Today's Date: 4/8/2025                    Insurance:  Visit number: 14 of 18  Authorization info: auth needed  Insurance Type: Payor:  EMPLOYEE MEDICAL PLAN / Plan:  EMPLOYEE MEDICAL PLAN [employer] / Product Type: *No Product type* /      Current Problem  1. Concussion with no loss of consciousness, subsequent encounter            General:       Precautions:     Medical History Form: Reviewed (scanned into chart)    Subjective:   ***    Pain:       Objective:   ROM  *denotes end range pain/symptoms in hand     Cervical AROM (Degrees)    Flexion: 30  Extension: 40  (L) Side Bend: 20*  (R) Side Bend: 30*  (L) Rotation: 60*  (R) Rotation: 90      Shoulder AROM (Degrees)      (R)  (L)  Flexion: 180  180   Abduction: 180  180     Functional ER: C6*  C6     Functional IR: L1*  T8         Elbow AROM (Degrees)      (R)  (L)  Flexion: 135  135      Extension: 0  0           Strength Testing  Deferred, at least 3/5 for all motions. Complete NV   Shoulder    (R)  (L)  Flexion: 3  3      Abduction: 3  3     ER:  3  3     IR:  3  3         Elbow    (R)  (L)  Flexion: 3  3       Extension: 3  3         Periscapular Musculature    (R)  (L)  Upper Traps: 3  3     Middle Traps: 3  3     Lower Traps: 3  3     Rhomboids: 3  3         Posture: increased thoracic kyphosis, mild rounded shoulders       Palpation: TrP's present in shoulder girdle near TP of thoracic spine      Joint Mobility: hypomobile cervical side glides B, produces pain R       Observation: no obvious discolorations or deformities present.          Special Tests    Cervical    Cervical flexion rotation test: neg  ULTT  Median: pos  Radial: pos  Ulnar: pos  DNF endurance: 10'  Alar ligament: neg  Sharp Skyla: neg    Thoracic Outlet   Adson's Test: pos   Rainer Test: pos   Marvin's: neg       Radicular Symptoms: Yes      Outcome Measures:        Treatments:    Manual  "Therapy: ***'  IASTM cervical and thoracic paraspinals, UT and scalenes  UT release R  Scalene release R   First rib mobs in seated    There-ex: ***'  First rib mobility w/ towel and 4\" holds x10*  Pec minor stretch in corner 2x30\"   Pec major stretch in corner 2x30\"     * = added to HEP.  Sheet with exercise descriptions and images issued.  Skilled intervention utilized in the appropriate selection & application of above exercises.  Verbal and tactile cues provided for proper form and technique.  Pt. demonstrated appropriate form & verbalized understanding of optimal technique for above exercises.    [email or web address]  Access Code: [email or web address]    EDUCATION: Home exercise program, plan of care, activity modifications, pain management, and injury pathology       Assessment:   Patient tolerated today's session w/ *** . Demonstrates improvements in *** and difficulty w/ ***. Patient will benefit from ongoing PT services to continue to progress *** to reach established goals to ***      Plan:  ***    Goals: Set and discussed today  Active       TOS/cervical radiculopathy       LTGs       Start:  04/03/25    Expected End:  05/29/25       1. Demonstrate independence with home exercise program  2. Tolerate increased exercise without adverse reaction  3. Demonstrate improved posture & proper body mechanics throughout session  4. Increase cervical ROM to pain free + 45 flexion, 45 extension, 45 SB B and 90 R B to improve ADLs.  5. Increase DNF strength by at least 30\" to meet MDC  6. Decrease score of NDI by > 5 points to meet the MCID  7. Perform ADLs without an increase symptoms  8. Demonstrate decreased neural tension by having -ULTT, Rainer and Adson's tests to improve functional mobility  9. Increase gross periscapular and shoulder strength to at least 4/5 to improve functional mobility and ADLs.                  Resolved       Concussion       STGs (Met)       Start:  12/09/24    Expected End:  " "02/04/25    Resolved:  04/03/25    1. Complete oculomotor and vestibular assessments with minimal symptoms and w/o the presence of abnormal eye movements  2. Decrease symptom score to 15 on PCSS to progress towards LTGs.  3. Demonstrate independence with home exercise program  4. Tolerate increased exercise without adverse reaction  5. Demonstrate improved posture & proper body mechanics throughout session         LTGs (Adequate for Discharge)       Start:  12/09/24    Expected End:  03/04/25       1. Progress through the Return to Function/Work protocols without increased symptoms  2. Complete oculomotor and vestibular assessments without increased symptoms or the presence of abnormal eye movements  3. Decrease symptom score to 0 on PCSS  Increase gross ROM to pain free + at least 65 degs flexion, 45 degs extension, 65 degs side bend B, 90 degs R B.  4. Increase deep neck flexor endurance strength by at least 30\" to meet the MDC.  5. Report decrease in pain by > 2 points to meet the MCID  6. Decrease score of NDI by > 5 points to meet the MCID                 Screening  Frequency  Date Last Completed   Spiritual and Cultural Beliefs   Screening each visit or episode of care 9/30/2024   Falls Risk Screening every ambulatory visit    Pain Screening annually at primary care visit  3/28/2025   Domestic Violence screening annually at primary care visit 9/30/2024   Depression Screening annually in the primary care setting 9/30/2024   Suicide Risk Screening annually in the primary care setting 11/1/2024   Nutrition and Food Insecurity   Screening at least annually at primary care visit     Key Learner annually in the primary care setting 9/30/2024   Drug Screen  9/30/2024 11:39 AM   Alcohol Screen  9/30/2024 11:39 AM   Advance Directive         Plan of care was developed with input and agreement by the patient      Sonido Dwyer, PT, ATC    "

## 2025-04-09 ENCOUNTER — OFFICE VISIT (OUTPATIENT)
Dept: SPORTS MEDICINE | Facility: HOSPITAL | Age: 60
End: 2025-04-09
Payer: COMMERCIAL

## 2025-04-09 VITALS — OXYGEN SATURATION: 99 % | HEART RATE: 79 BPM | TEMPERATURE: 99 F

## 2025-04-09 DIAGNOSIS — M99.02 SOMATIC DYSFUNCTION OF THORACIC REGION: ICD-10-CM

## 2025-04-09 DIAGNOSIS — M99.08 SOMATIC DYSFUNCTION OF RIB CAGE REGION: ICD-10-CM

## 2025-04-09 DIAGNOSIS — M54.2 NECK PAIN: Primary | ICD-10-CM

## 2025-04-09 DIAGNOSIS — M99.07 SOMATIC DYSFUNCTION OF UPPER EXTREMITY: ICD-10-CM

## 2025-04-09 DIAGNOSIS — M99.00 SOMATIC DYSFUNCTION OF HEAD REGION: ICD-10-CM

## 2025-04-09 DIAGNOSIS — M99.03 SOMATIC DYSFUNCTION OF LUMBAR REGION: ICD-10-CM

## 2025-04-09 DIAGNOSIS — M48.02 STENOSIS, CERVICAL SPINE: ICD-10-CM

## 2025-04-09 DIAGNOSIS — M99.01 SOMATIC DYSFUNCTION OF CERVICAL REGION: ICD-10-CM

## 2025-04-09 PROCEDURE — 99214 OFFICE O/P EST MOD 30 MIN: CPT | Mod: 25 | Performed by: PEDIATRICS

## 2025-04-09 PROCEDURE — 1036F TOBACCO NON-USER: CPT | Performed by: PEDIATRICS

## 2025-04-09 PROCEDURE — 99214 OFFICE O/P EST MOD 30 MIN: CPT | Performed by: PEDIATRICS

## 2025-04-09 PROCEDURE — 98927 OSTEOPATH MANJ 5-6 REGIONS: CPT | Performed by: PEDIATRICS
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
## 2025-04-10 ENCOUNTER — PHARMACY VISIT (OUTPATIENT)
Dept: PHARMACY | Facility: CLINIC | Age: 60
End: 2025-04-10
Payer: COMMERCIAL

## 2025-04-10 PROCEDURE — RXMED WILLOW AMBULATORY MEDICATION CHARGE

## 2025-04-10 RX ORDER — CYCLOBENZAPRINE HCL 5 MG
5 TABLET ORAL 3 TIMES DAILY PRN
Qty: 30 TABLET | Refills: 0 | Status: SHIPPED | OUTPATIENT
Start: 2025-04-10 | End: 2025-04-20

## 2025-04-11 ENCOUNTER — TREATMENT (OUTPATIENT)
Dept: PHYSICAL THERAPY | Facility: CLINIC | Age: 60
End: 2025-04-11
Payer: COMMERCIAL

## 2025-04-11 DIAGNOSIS — M47.812 CERVICAL SPONDYLOSIS: ICD-10-CM

## 2025-04-11 DIAGNOSIS — M54.12 RADICULOPATHY, CERVICAL: ICD-10-CM

## 2025-04-11 DIAGNOSIS — S06.0X0D CONCUSSION WITH NO LOSS OF CONSCIOUSNESS, SUBSEQUENT ENCOUNTER: ICD-10-CM

## 2025-04-11 DIAGNOSIS — G54.0 THORACIC OUTLET SYNDROME: Primary | ICD-10-CM

## 2025-04-11 PROCEDURE — 97140 MANUAL THERAPY 1/> REGIONS: CPT | Mod: GP

## 2025-04-11 ASSESSMENT — PAIN SCALES - GENERAL: PAINLEVEL_OUTOF10: 0 - NO PAIN

## 2025-04-11 ASSESSMENT — PAIN - FUNCTIONAL ASSESSMENT: PAIN_FUNCTIONAL_ASSESSMENT: 0-10

## 2025-04-11 NOTE — PROGRESS NOTES
Physical Therapy  Physical Therapy Treatment    Patient Name: Jessica Jordan  MRN: [id number]  Today's Date: 4/11/2025  Time Calculation  Start Time: 0745  Stop Time: 0823  Time Calculation (min): 38 min      PT Therapeutic Procedures Time Entry  Manual Therapy Time Entry: 38          Insurance:  Visit number: 14 of 18  Authorization info: auth needed  Insurance Type: Payor:  EMPLOYEE MEDICAL PLAN / Plan:  EMPLOYEE MEDICAL PLAN CON[employer]ER [employer] / Product Type: *No Product type* /      Current Problem  1. Thoracic outlet syndrome        2. Concussion with no loss of consciousness, subsequent encounter        3. Radiculopathy, cervical        4. Cervical spondylosis            General:  General  Reason for Referral: Cervical radiculopathy  Referred By: Dr. Lawton    Precautions:  Precautions  Precautions Comment: anxiety, depression  Medical History Form: Reviewed (scanned into chart)    Subjective:   Patient reports she is feeling better today. No headache and minimal paresthesia entering clinic today. Feels home traction unit has made no change in symptoms. Will be following up w/ pain management.     Pain:  Pain Assessment: 0-10  0-10 (Numeric) Pain Score: 0 - No pain (2-3 paresthesia)    Objective:   ROM  *denotes end range pain/symptoms in hand     Cervical AROM (Degrees)    Flexion: 30  Extension: 40  (L) Side Bend: 20*  (R) Side Bend: 30*  (L) Rotation: 60*  (R) Rotation: 90      Shoulder AROM (Degrees)      (R)  (L)  Flexion: 180  180   Abduction: 180  180     Functional ER: C6*  C6     Functional IR: L1*  T8         Elbow AROM (Degrees)      (R)  (L)  Flexion: 135  135      Extension: 0  0           Strength Testing  Deferred, at least 3/5 for all motions. Complete NV   Shoulder    (R)  (L)  Flexion: 3  3      Abduction: 3  3     ER:  3  3     IR:  3  3         Elbow    (R)  (L)  Flexion: 3  3       Extension: 3  3         Periscapular Musculature    (R)  (L)  Upper Traps: 3  3     Middle  Traps: 3  3     Lower Traps: 3  3     Rhomboids: 3  3         Posture: increased thoracic kyphosis, mild rounded shoulders       Palpation: TrP's present in shoulder girdle near TP of thoracic spine      Joint Mobility: hypomobile cervical side glides B, produces pain R       Observation: no obvious discolorations or deformities present.          Special Tests    Cervical    Cervical flexion rotation test: neg  ULTT  Median: pos  Radial: pos  Ulnar: pos  DNF endurance: 10'  Alar ligament: neg  Sharp Skyla: neg    Thoracic Outlet   Adson's Test: pos   Rainer Test: pos   Marvin's: neg       Radicular Symptoms: Yes      Outcome Measures:        Treatments:    Manual Therapy: 38'  IASTM cervical and thoracic paraspinals, UT and scalenes  Trp B UT, scalenes  Effleurage to B thoracic, cervical paraspinals  Prone CTJ manipulation B  Prone upper thoracic PA manipulation     There-ex: 0'      * = added to HEP.  Sheet with exercise descriptions and images issued.  Skilled intervention utilized in the appropriate selection & application of above exercises.  Verbal and tactile cues provided for proper form and technique.  Pt. demonstrated appropriate form & verbalized understanding of optimal technique for above exercises.    [email or web address]  Access Code: [email or web address]    EDUCATION: Home exercise program, plan of care, activity modifications, pain management, and injury pathology       Assessment:   Patient tolerated today's session w/ a reported reduction of familiar tightness and soreness. Appears to have responded well to manipulations. Patient reports improvements in symptoms and will benefit from ongoing PT services to continue to progress cervical and thoracic mobility, as well as manual prn for symptom control to reach established goals to return to PLOF.      Plan:  Continue manual prn. Resume there-ex next visit: DNF lift offs, progress cervical isometrics to include ball use and trial of isotonic  "cervical extension. Review pain management check in    Goals: Set and discussed today  Active       TOS/cervical radiculopathy       LTGs       Start:  04/03/25    Expected End:  05/29/25       1. Demonstrate independence with home exercise program  2. Tolerate increased exercise without adverse reaction  3. Demonstrate improved posture & proper body mechanics throughout session  4. Increase cervical ROM to pain free + 45 flexion, 45 extension, 45 SB B and 90 R B to improve ADLs.  5. Increase DNF strength by at least 30\" to meet MDC  6. Decrease score of NDI by > 5 points to meet the MCID  7. Perform ADLs without an increase symptoms  8. Demonstrate decreased neural tension by having -ULTT, Rainer and Adson's tests to improve functional mobility  9. Increase gross periscapular and shoulder strength to at least 4/5 to improve functional mobility and ADLs.                  Resolved       Concussion       STGs (Met)       Start:  12/09/24    Expected End:  02/04/25    Resolved:  04/03/25    1. Complete oculomotor and vestibular assessments with minimal symptoms and w/o the presence of abnormal eye movements  2. Decrease symptom score to 15 on PCSS to progress towards LTGs.  3. Demonstrate independence with home exercise program  4. Tolerate increased exercise without adverse reaction  5. Demonstrate improved posture & proper body mechanics throughout session         LTGs (Adequate for Discharge)       Start:  12/09/24    Expected End:  03/04/25       1. Progress through the Return to Function/Work protocols without increased symptoms  2. Complete oculomotor and vestibular assessments without increased symptoms or the presence of abnormal eye movements  3. Decrease symptom score to 0 on PCSS  Increase gross ROM to pain free + at least 65 degs flexion, 45 degs extension, 65 degs side bend B, 90 degs R B.  4. Increase deep neck flexor endurance strength by at least 30\" to meet the MDC.  5. Report decrease in pain by > 2 " points to meet the MCID  6. Decrease score of NDI by > 5 points to meet the MCID                 Screening  Frequency  Date Last Completed   Spiritual and Cultural Beliefs   Screening each visit or episode of care 9/30/2024   Falls Risk Screening every ambulatory visit    Pain Screening annually at primary care visit  3/28/2025   Domestic Violence screening annually at primary care visit 9/30/2024   Depression Screening annually in the primary care setting 9/30/2024   Suicide Risk Screening annually in the primary care setting 11/1/2024   Nutrition and Food Insecurity   Screening at least annually at primary care visit     Key Learner annually in the primary care setting 9/30/2024   Drug Screen  9/30/2024 11:39 AM   Alcohol Screen  9/30/2024 11:39 AM   Advance Directive         Plan of care was developed with input and agreement by the patient      Sonido Dwyer, PT, ATC

## 2025-04-15 ENCOUNTER — OFFICE VISIT (OUTPATIENT)
Dept: PAIN MEDICINE | Facility: HOSPITAL | Age: 60
End: 2025-04-15
Payer: COMMERCIAL

## 2025-04-15 DIAGNOSIS — M54.12 CERVICAL RADICULOPATHY: ICD-10-CM

## 2025-04-15 PROCEDURE — 99214 OFFICE O/P EST MOD 30 MIN: CPT | Performed by: ANESTHESIOLOGY

## 2025-04-15 PROCEDURE — 99204 OFFICE O/P NEW MOD 45 MIN: CPT | Performed by: ANESTHESIOLOGY

## 2025-04-15 PROCEDURE — RXMED WILLOW AMBULATORY MEDICATION CHARGE

## 2025-04-15 RX ORDER — TOPIRAMATE 25 MG/1
TABLET ORAL
Qty: 70 TABLET | Refills: 0 | Status: SHIPPED | OUTPATIENT
Start: 2025-04-15

## 2025-04-15 ASSESSMENT — PAIN SCALES - GENERAL: PAINLEVEL_OUTOF10: 9
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
## 2025-04-16 ENCOUNTER — TREATMENT (OUTPATIENT)
Dept: PHYSICAL THERAPY | Facility: CLINIC | Age: 60
End: 2025-04-16
Payer: COMMERCIAL

## 2025-04-16 ENCOUNTER — PHARMACY VISIT (OUTPATIENT)
Dept: PHARMACY | Facility: CLINIC | Age: 60
End: 2025-04-16
Payer: COMMERCIAL

## 2025-04-16 DIAGNOSIS — M54.12 RADICULOPATHY, CERVICAL: ICD-10-CM

## 2025-04-16 DIAGNOSIS — G54.0 THORACIC OUTLET SYNDROME: Primary | ICD-10-CM

## 2025-04-16 DIAGNOSIS — M47.812 CERVICAL SPONDYLOSIS: ICD-10-CM

## 2025-04-16 DIAGNOSIS — S06.0X0D CONCUSSION WITH NO LOSS OF CONSCIOUSNESS, SUBSEQUENT ENCOUNTER: ICD-10-CM

## 2025-04-16 PROCEDURE — 97140 MANUAL THERAPY 1/> REGIONS: CPT | Mod: GP

## 2025-04-16 PROCEDURE — 97110 THERAPEUTIC EXERCISES: CPT | Mod: GP

## 2025-04-16 ASSESSMENT — PAIN - FUNCTIONAL ASSESSMENT: PAIN_FUNCTIONAL_ASSESSMENT: 0-10

## 2025-04-16 ASSESSMENT — PAIN SCALES - GENERAL: PAINLEVEL_OUTOF10: 0 - NO PAIN

## 2025-04-16 NOTE — PROGRESS NOTES
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 Thoracic outlet syndrome        2. Concussion with no loss of consciousness, subsequent encounter        3. Radiculopathy, cervical        4. Cervical spondylosis            General:  General  Reason for Referral: Cervical radiculopathy  Referred By: Dr. Lawton    Precautions:  Precautions  Precautions Comment: anxiety, depression  Medical History Form: Reviewed (scanned into chart)    Subjective:   Patient reports she is feeling pretty good this week. Arm paresthesia is not as severe as it has been. Had pain management appointment and will be trying an injection.     Pain:  Pain Assessment: 0-10  0-10 (Numeric) Pain Score: 0 - No pain (less paresthesia today)    Objective:   ROM  *denotes end range pain/symptoms in hand     Cervical AROM (Degrees)    Flexion: 30  Extension: 40  (L) Side Bend: 20*  (R) Side Bend: 30*  (L) Rotation: 60*  (R) Rotation: 90      Shoulder AROM (Degrees)      (R)  (L)  Flexion: 180  180   Abduction: 180  180     Functional ER: C6*  C6     Functional IR: L1*  T8         Elbow AROM (Degrees)      (R)  (L)  Flexion: 135  135      Extension: 0  0           Strength Testing  Deferred, at least 3/5 for all motions. Complete NV   Shoulder    (R)  (L)  Flexion: 3  3      Abduction: 3  3     ER:  3  3     IR:  3  3         Elbow    (R)  (L)  Flexion: 3  3       Extension: 3  3         Periscapular Musculature    (R)  (L)  Upper Traps: 3  3     Middle  "Traps: 3  3     Lower Traps: 3  3     Rhomboids: 3  3         Posture: increased thoracic kyphosis, mild rounded shoulders       Palpation: TrP's present in shoulder girdle near TP of thoracic spine      Joint Mobility: hypomobile cervical side glides B, produces pain R       Observation: no obvious discolorations or deformities present.          Special Tests    Cervical    Cervical flexion rotation test: neg  ULTT  Median: pos  Radial: pos  Ulnar: pos  DNF endurance: 10'  Alar ligament: neg  Sharp Skyla: neg    Thoracic Outlet   Adson's Test: pos   Rainer Test: pos   Mavrin's: neg       Radicular Symptoms: Yes      Outcome Measures:        Treatments:    Manual Therapy: 25'  IASTM cervical and thoracic paraspinals, UT and scalenes  Trp B UT, scalenes  Effleurage to B thoracic, cervical paraspinals  Prone CTJ manipulation B  Prone upper thoracic PA manipulation     There-ex: 20'  Prone neck extension off EOB 2x10  DNF lift offs 3x10  Cervical side bend isometric wall balls w/ 5\" holds 2x10  Cervical flexion isometric wall balls w/ 5\" holds 2x10  Red TB face pulls 3x15      [email or web address]  Access Code: [email or web address]    EDUCATION: Home exercise program, plan of care, activity modifications, pain management, and injury pathology       Assessment:   Patient tolerated today's session w/ expected muscle fatigue. Demonstrates improvements in cervical strength displayed through improved performance of there-ex. Continues to have difficulty w/ arm paresthesia that is limiting ADLs. Patient will benefit from ongoing PT services to continue to progress cervical and thoracic mobility, as well as cervical and thoracic strengthening as tolerated to reach established goals to return to PLOF.       Plan:  Continue manual prn. Trial of pallof circles, corner pec stretching and serratus strengthening.     Goals: Set and discussed today  Active       TOS/cervical radiculopathy       LTGs       Start:  04/03/25  " "  Expected End:  05/29/25       1. Demonstrate independence with home exercise program  2. Tolerate increased exercise without adverse reaction  3. Demonstrate improved posture & proper body mechanics throughout session  4. Increase cervical ROM to pain free + 45 flexion, 45 extension, 45 SB B and 90 R B to improve ADLs.  5. Increase DNF strength by at least 30\" to meet MDC  6. Decrease score of NDI by > 5 points to meet the MCID  7. Perform ADLs without an increase symptoms  8. Demonstrate decreased neural tension by having -ULTT, Rainer and Adson's tests to improve functional mobility  9. Increase gross periscapular and shoulder strength to at least 4/5 to improve functional mobility and ADLs.                  Resolved       Concussion       STGs (Met)       Start:  12/09/24    Expected End:  02/04/25    Resolved:  04/03/25    1. Complete oculomotor and vestibular assessments with minimal symptoms and w/o the presence of abnormal eye movements  2. Decrease symptom score to 15 on PCSS to progress towards LTGs.  3. Demonstrate independence with home exercise program  4. Tolerate increased exercise without adverse reaction  5. Demonstrate improved posture & proper body mechanics throughout session         LTGs (Adequate for Discharge)       Start:  12/09/24    Expected End:  03/04/25       1. Progress through the Return to Function/Work protocols without increased symptoms  2. Complete oculomotor and vestibular assessments without increased symptoms or the presence of abnormal eye movements  3. Decrease symptom score to 0 on PCSS  Increase gross ROM to pain free + at least 65 degs flexion, 45 degs extension, 65 degs side bend B, 90 degs R B.  4. Increase deep neck flexor endurance strength by at least 30\" to meet the MDC.  5. Report decrease in pain by > 2 points to meet the MCID  6. Decrease score of NDI by > 5 points to meet the MCID                 Screening  Frequency  Date Last Completed   Spiritual and Cultural " Beliefs   Screening each visit or episode of care 9/30/2024   Falls Risk Screening every ambulatory visit    Pain Screening annually at primary care visit  4/15/2025   Domestic Violence screening annually at primary care visit 9/30/2024   Depression Screening annually in the primary care setting 9/30/2024   Suicide Risk Screening annually in the primary care setting 11/1/2024   Nutrition and Food Insecurity   Screening at least annually at primary care visit     Key Learner annually in the primary care setting 9/30/2024   Drug Screen  9/30/2024 11:39 AM   Alcohol Screen  9/30/2024 11:39 AM   Advance Directive         Plan of care was developed with input and agreement by the patient      Sonido Dwyer, PT, ATC

## 2025-04-18 ENCOUNTER — TREATMENT (OUTPATIENT)
Dept: PHYSICAL THERAPY | Facility: CLINIC | Age: 60
End: 2025-04-18
Payer: COMMERCIAL

## 2025-04-18 DIAGNOSIS — S06.0X0D CONCUSSION WITH NO LOSS OF CONSCIOUSNESS, SUBSEQUENT ENCOUNTER: ICD-10-CM

## 2025-04-18 DIAGNOSIS — G54.0 THORACIC OUTLET SYNDROME: Primary | ICD-10-CM

## 2025-04-18 DIAGNOSIS — M47.812 CERVICAL SPONDYLOSIS: ICD-10-CM

## 2025-04-18 DIAGNOSIS — M54.12 RADICULOPATHY, CERVICAL: ICD-10-CM

## 2025-04-18 PROCEDURE — 97140 MANUAL THERAPY 1/> REGIONS: CPT | Mod: GP

## 2025-04-18 PROCEDURE — 97110 THERAPEUTIC EXERCISES: CPT | Mod: GP

## 2025-04-18 ASSESSMENT — PAIN - FUNCTIONAL ASSESSMENT: PAIN_FUNCTIONAL_ASSESSMENT: 0-10

## 2025-04-18 ASSESSMENT — PAIN SCALES - GENERAL: PAINLEVEL_OUTOF10: 0 - NO PAIN

## 2025-04-18 NOTE — PROGRESS NOTES
Physical Therapy  Physical Therapy Treatment    Patient Name: Jessica Jordan  MRN: [id number]  Today's Date: 4/18/2025  Time Calculation  Start Time: 0747  Stop Time: 0827  Time Calculation (min): 40 min      PT Therapeutic Procedures Time Entry  Manual Therapy Time Entry: 25  Therapeutic Exercise Time Entry: 15          Insurance:  Visit number: 16 of 18  Authorization info: auth needed  Insurance Type: Payor:  EMPLOYEE MEDICAL PLAN / Plan:  EMPLOYEE [employer]AN [employer] / Product Type: *No Product type* /      Current Problem  1. Thoracic outlet syndrome        2. Concussion with no loss of consciousness, subsequent encounter        3. Radiculopathy, cervical        4. Cervical spondylosis            General:  General  Reason for Referral: Cervical radiculopathy  Referred By: Dr. Lawton    Precautions:  Precautions  Precautions Comment: anxiety, depression  Medical History Form: Reviewed (scanned into chart)    Subjective:   Patient reports she was pretty sore after last session. Felt some soreness was good, some not so much.     Pain:  Pain Assessment: 0-10  0-10 (Numeric) Pain Score: 0 - No pain (mildly more paresthesia than previous session)    Objective:   ROM  *denotes end range pain/symptoms in hand     Cervical AROM (Degrees)    Flexion: 30  Extension: 40  (L) Side Bend: 20*  (R) Side Bend: 30*  (L) Rotation: 60*  (R) Rotation: 90      Shoulder AROM (Degrees)      (R)  (L)  Flexion: 180  180   Abduction: 180  180     Functional ER: C6*  C6     Functional IR: L1*  T8         Elbow AROM (Degrees)      (R)  (L)  Flexion: 135  135      Extension: 0  0           Strength Testing  Deferred, at least 3/5 for all motions. Complete NV   Shoulder    (R)  (L)  Flexion: 3  3      Abduction: 3  3     ER:  3  3     IR:  3  3         Elbow    (R)  (L)  Flexion: 3  3       Extension: 3  3         Periscapular Musculature    (R)  (L)  Upper Traps: 3  3     Middle Traps: 3  3     Lower  Traps: 3  3     Rhomboids: 3  3         Posture: increased thoracic kyphosis, mild rounded shoulders       Palpation: TrP's present in shoulder girdle near TP of thoracic spine      Joint Mobility: hypomobile cervical side glides B, produces pain R       Observation: no obvious discolorations or deformities present.          Special Tests    Cervical    Cervical flexion rotation test: neg  ULTT  Median: pos  Radial: pos  Ulnar: pos  DNF endurance: 10'  Alar ligament: neg  Sharp Skyla: neg    Thoracic Outlet   Adson's Test: pos   Rainer Test: pos   Marvin's: neg       Radicular Symptoms: Yes      Outcome Measures:        Treatments:    Manual Therapy: 25'  IASTM cervical and thoracic paraspinals, UT and scalenes  Trp B UT, scalenes  Seated first rib mobs  Cervical side glides B grade 3-4    There-ex: 15'  DB shrugs w/ 20# 3x12  Yellow stroops TB pallof circles 2x12 CW and CCW each B  Yellow TB serratus OH stabilization lifts 3x6*      [email or web address]  Access Code: [email or web address]    EDUCATION: Home exercise program, plan of care, activity modifications, pain management, and injury pathology       Assessment:   Patient tolerated today's session w/ expected muscle soreness and fatigue. Did well w/ exercises in today's session, responded well to first rib mobs and side glides. Continues to have difficulty  w/ arm paresthesia which is limiting ADLs and work capacity. Patient will benefit from ongoing PT services to continue to progress scapular strengthening, as well as cervical strengthening, thoracic and cervical mobility to reach established goals to return to PLOF.       Plan:  Continue manual prn. Trial of cervical isotonic w/ TB, serratus punches and wall slides. Horizontal adduction.     Goals: Set and discussed today  Active       TOS/cervical radiculopathy       LTGs       Start:  04/03/25    Expected End:  05/29/25       1. Demonstrate independence with home exercise program  2. Tolerate  "increased exercise without adverse reaction  3. Demonstrate improved posture & proper body mechanics throughout session  4. Increase cervical ROM to pain free + 45 flexion, 45 extension, 45 SB B and 90 R B to improve ADLs.  5. Increase DNF strength by at least 30\" to meet MDC  6. Decrease score of NDI by > 5 points to meet the MCID  7. Perform ADLs without an increase symptoms  8. Demonstrate decreased neural tension by having -ULTT, Rainer and Adson's tests to improve functional mobility  9. Increase gross periscapular and shoulder strength to at least 4/5 to improve functional mobility and ADLs.                  Resolved       Concussion       STGs (Met)       Start:  12/09/24    Expected End:  02/04/25    Resolved:  04/03/25    1. Complete oculomotor and vestibular assessments with minimal symptoms and w/o the presence of abnormal eye movements  2. Decrease symptom score to 15 on PCSS to progress towards LTGs.  3. Demonstrate independence with home exercise program  4. Tolerate increased exercise without adverse reaction  5. Demonstrate improved posture & proper body mechanics throughout session         LTGs (Adequate for Discharge)       Start:  12/09/24    Expected End:  03/04/25       1. Progress through the Return to Function/Work protocols without increased symptoms  2. Complete oculomotor and vestibular assessments without increased symptoms or the presence of abnormal eye movements  3. Decrease symptom score to 0 on PCSS  Increase gross ROM to pain free + at least 65 degs flexion, 45 degs extension, 65 degs side bend B, 90 degs R B.  4. Increase deep neck flexor endurance strength by at least 30\" to meet the MDC.  5. Report decrease in pain by > 2 points to meet the MCID  6. Decrease score of NDI by > 5 points to meet the MCID                 Screening  Frequency  Date Last Completed   Spiritual and Cultural Beliefs   Screening each visit or episode of care 9/30/2024   Falls Risk Screening every ambulatory " visit    Pain Screening annually at primary care visit  4/15/2025   Domestic Violence screening annually at primary care visit 9/30/2024   Depression Screening annually in the primary care setting 9/30/2024   Suicide Risk Screening annually in the primary care setting 11/1/2024   Nutrition and Food Insecurity   Screening at least annually at primary care visit     Key Learner annually in the primary care setting 9/30/2024   Drug Screen  9/30/2024 11:39 AM   Alcohol Screen  9/30/2024 11:39 AM   Advance Directive         Plan of care was developed with input and agreement by the patient      Sonido Dwyer, PT, ATC

## 2025-04-21 DIAGNOSIS — M54.50 ACUTE MIDLINE LOW BACK PAIN WITHOUT SCIATICA: ICD-10-CM

## 2025-04-21 DIAGNOSIS — S06.0X0A CONCUSSION WITHOUT LOSS OF CONSCIOUSNESS, INITIAL ENCOUNTER: ICD-10-CM

## 2025-04-21 DIAGNOSIS — M54.2 NECK PAIN: ICD-10-CM

## 2025-04-22 ENCOUNTER — TREATMENT (OUTPATIENT)
Dept: PHYSICAL THERAPY | Facility: CLINIC | Age: 60
End: 2025-04-22
Payer: COMMERCIAL

## 2025-04-22 DIAGNOSIS — S06.0X0D CONCUSSION WITH NO LOSS OF CONSCIOUSNESS, SUBSEQUENT ENCOUNTER: ICD-10-CM

## 2025-04-22 DIAGNOSIS — M54.12 RADICULOPATHY, CERVICAL: ICD-10-CM

## 2025-04-22 DIAGNOSIS — M47.812 CERVICAL SPONDYLOSIS: ICD-10-CM

## 2025-04-22 DIAGNOSIS — G54.0 THORACIC OUTLET SYNDROME: Primary | ICD-10-CM

## 2025-04-22 PROCEDURE — 97140 MANUAL THERAPY 1/> REGIONS: CPT | Mod: GP

## 2025-04-22 PROCEDURE — 97110 THERAPEUTIC EXERCISES: CPT | Mod: GP

## 2025-04-22 RX ORDER — CYCLOBENZAPRINE HCL 5 MG
5 TABLET ORAL 3 TIMES DAILY PRN
Qty: 30 TABLET | Refills: 0 | OUTPATIENT
Start: 2025-04-22 | End: 2025-05-02

## 2025-04-22 ASSESSMENT — PAIN SCALES - GENERAL: PAINLEVEL_OUTOF10: 0 - NO PAIN

## 2025-04-22 ASSESSMENT — PAIN - FUNCTIONAL ASSESSMENT: PAIN_FUNCTIONAL_ASSESSMENT: 0-10

## 2025-04-22 NOTE — PROGRESS NOTES
Physical Therapy  Physical Therapy Treatment    Patient Name: Jessica Jordan  MRN: [id number]  Today's Date: 4/22/2025  Time Calculation  Start Time: 1400  Stop Time: 1440  Time Calculation (min): 40 min      PT Therapeutic Procedures Time Entry  Manual Therapy Time Entry: 25  Therapeutic Exercise Time Entry: 15          Insurance:  Visit number: 17 of 18  Authorization info: auth needed  Insurance Type: Payor:  EMPLOYEE MEDICAL PLAN / Plan:  EMPLOYEE [employer]ICAL [employer]AN [employer] / Product Type: *No Product type* /      Current Problem  1. Thoracic outlet syndrome        2. Concussion with no loss of consciousness, subsequent encounter        3. Radiculopathy, cervical        4. Cervical spondylosis            General:  General  Reason for Referral: Cervical radiculopathy  Referred By: Dr. Lawton    Precautions:  Precautions  Precautions Comment: anxiety, depression  Medical History Form: Reviewed (scanned into chart)    Subjective:   Patient reports she is feeling ok today. Mild-moderate paresthesia today, feels a little more flared up today than previous visit.     Pain:  Pain Assessment: 0-10  0-10 (Numeric) Pain Score: 0 - No pain (mild to moderate paresthesia)    Objective:   ROM  *denotes end range pain/symptoms in hand     Cervical AROM (Degrees)    Flexion: 30  Extension: 40  (L) Side Bend: 20*  (R) Side Bend: 30*  (L) Rotation: 60*  (R) Rotation: 90      Shoulder AROM (Degrees)      (R)  (L)  Flexion: 180  180   Abduction: 180  180     Functional ER: C6*  C6     Functional IR: L1*  T8         Elbow AROM (Degrees)      (R)  (L)  Flexion: 135  135      Extension: 0  0           Strength Testing  Deferred, at least 3/5 for all motions. Complete NV   Shoulder    (R)  (L)  Flexion: 3  3      Abduction: 3  3     ER:  3  3     IR:  3  3         Elbow    (R)  (L)  Flexion: 3  3       Extension: 3  3         Periscapular Musculature    (R)  (L)  Upper Traps: 3  3     Middle Traps: 3  3     Lower  Traps: 3  3     Rhomboids: 3  3         Posture: increased thoracic kyphosis, mild rounded shoulders       Palpation: TrP's present in shoulder girdle near TP of thoracic spine      Joint Mobility: hypomobile cervical side glides B, produces pain R       Observation: no obvious discolorations or deformities present.          Special Tests    Cervical    Cervical flexion rotation test: neg  ULTT  Median: pos  Radial: pos  Ulnar: pos  DNF endurance: 10'  Alar ligament: neg  Sharp Skyla: neg    Thoracic Outlet   Adson's Test: pos   Rainer Test: pos   Marvin's: neg       Radicular Symptoms: Yes      Outcome Measures:        Treatments:    Manual Therapy: 25'  IASTM cervical and thoracic paraspinals, UT and scalenes  Trp B UT, scalenes  Seated first rib mobs  Cervical side glides B grade 3-4  Prone TP PA glides grade 3-4 C0-C7    There-ex: 15'  Supine serratus punches w/ 3# DB 3x10 B  4 way cervical isotonics w/ PT manual resistance: flexion/ext, SB B 2x8-10 each  Yellow stroops TB horizontal adduction 3x10 B      [email or web address]  Access Code: [email or web address]    EDUCATION: Home exercise program, plan of care, activity modifications, pain management, and injury pathology       Assessment:   Patient tolerated today's session w/ expected muscle soreness and fatigue. Responded well to manual, continues to have difficultty w/ ADLS and all there-ex activities due to aggravation of hand paresthesia. Patient will benefit from ongoing PT services to continue to progress cervical and thoracic mobility, as well as scapular and cervical strengthening as tolerated to reach established goals to return to PLOF.         Plan:  Recheck and update goals. Continue manual, pec, scalene and SCM stretching emphasis.     Goals: Set and discussed today  Active       TOS/cervical radiculopathy       LTGs       Start:  04/03/25    Expected End:  05/29/25       1. Demonstrate independence with home exercise program  2. Tolerate  "increased exercise without adverse reaction  3. Demonstrate improved posture & proper body mechanics throughout session  4. Increase cervical ROM to pain free + 45 flexion, 45 extension, 45 SB B and 90 R B to improve ADLs.  5. Increase DNF strength by at least 30\" to meet MDC  6. Decrease score of NDI by > 5 points to meet the MCID  7. Perform ADLs without an increase symptoms  8. Demonstrate decreased neural tension by having -ULTT, Rainer and Adson's tests to improve functional mobility  9. Increase gross periscapular and shoulder strength to at least 4/5 to improve functional mobility and ADLs.                  Resolved       Concussion       STGs (Met)       Start:  12/09/24    Expected End:  02/04/25    Resolved:  04/03/25    1. Complete oculomotor and vestibular assessments with minimal symptoms and w/o the presence of abnormal eye movements  2. Decrease symptom score to 15 on PCSS to progress towards LTGs.  3. Demonstrate independence with home exercise program  4. Tolerate increased exercise without adverse reaction  5. Demonstrate improved posture & proper body mechanics throughout session         LTGs (Adequate for Discharge)       Start:  12/09/24    Expected End:  03/04/25       1. Progress through the Return to Function/Work protocols without increased symptoms  2. Complete oculomotor and vestibular assessments without increased symptoms or the presence of abnormal eye movements  3. Decrease symptom score to 0 on PCSS  Increase gross ROM to pain free + at least 65 degs flexion, 45 degs extension, 65 degs side bend B, 90 degs R B.  4. Increase deep neck flexor endurance strength by at least 30\" to meet the MDC.  5. Report decrease in pain by > 2 points to meet the MCID  6. Decrease score of NDI by > 5 points to meet the MCID                 Screening  Frequency  Date Last Completed   Spiritual and Cultural Beliefs   Screening each visit or episode of care 9/30/2024   Falls Risk Screening every ambulatory " visit    Pain Screening annually at primary care visit  4/15/2025   Domestic Violence screening annually at primary care visit 9/30/2024   Depression Screening annually in the primary care setting 9/30/2024   Suicide Risk Screening annually in the primary care setting 11/1/2024   Nutrition and Food Insecurity   Screening at least annually at primary care visit     Key Learner annually in the primary care setting 9/30/2024   Drug Screen  9/30/2024 11:39 AM   Alcohol Screen  9/30/2024 11:39 AM   Advance Directive         Plan of care was developed with input and agreement by the patient      Sonido Dwyer, PT, ATC

## 2025-04-23 ENCOUNTER — APPOINTMENT (OUTPATIENT)
Dept: SPORTS MEDICINE | Facility: HOSPITAL | Age: 60
End: 2025-04-23
Payer: COMMERCIAL

## 2025-04-25 ENCOUNTER — TREATMENT (OUTPATIENT)
Dept: PHYSICAL THERAPY | Facility: CLINIC | Age: 60
End: 2025-04-25
Payer: COMMERCIAL

## 2025-04-25 DIAGNOSIS — G54.0 THORACIC OUTLET SYNDROME: ICD-10-CM

## 2025-04-25 DIAGNOSIS — S06.0X0D CONCUSSION WITH NO LOSS OF CONSCIOUSNESS, SUBSEQUENT ENCOUNTER: ICD-10-CM

## 2025-04-25 DIAGNOSIS — M47.812 CERVICAL SPONDYLOSIS: ICD-10-CM

## 2025-04-25 DIAGNOSIS — M54.12 RADICULOPATHY, CERVICAL: Primary | ICD-10-CM

## 2025-04-25 PROCEDURE — 97140 MANUAL THERAPY 1/> REGIONS: CPT | Mod: GP

## 2025-04-25 PROCEDURE — 97110 THERAPEUTIC EXERCISES: CPT | Mod: GP

## 2025-04-25 ASSESSMENT — PAIN SCALES - GENERAL: PAINLEVEL_OUTOF10: 4

## 2025-04-25 ASSESSMENT — PAIN - FUNCTIONAL ASSESSMENT: PAIN_FUNCTIONAL_ASSESSMENT: 0-10

## 2025-04-25 NOTE — PROGRESS NOTES
Physical Therapy  Physical Therapy Treatment    Patient Name: Jessica Jordan  MRN: [id number]  Today's Date: 4/25/2025  Time Calculation  Start Time: 0747  Stop Time: 0832  Time Calculation (min): 45 min      PT Therapeutic Procedures Time Entry  Manual Therapy Time Entry: 30  Therapeutic Exercise Time Entry: 15          Insurance:  Visit number: 18 of 18  Authorization info: auth needed  Insurance Type: Payor:  EMPLOYEE MEDICAL PLAN / Plan:  EMPLOYEE [employer]ICAL PLAN [employer] / Product Type: *No Product type* /      Current Problem  1. Radiculopathy, cervical        2. Thoracic outlet syndrome        3. Cervical spondylosis        4. Concussion with no loss of consciousness, subsequent encounter [S06.0X0D]            General:  General  Reason for Referral: Cervical radiculopathy  Referred By: Dr. Lawton    Precautions:  Precautions  Precautions Comment: anxiety, depression  Medical History Form: Reviewed (scanned into chart)    Subjective:   Patient reports she is feeling ok today, more sore and achy than usual. Feels she slept awkwardly. Does feel PT has been helpful for majority of symptoms, hand paresthesia remains unchanged.    Pain:  Pain Assessment: 0-10  0-10 (Numeric) Pain Score: 4 (more soreness in her neck than pain)    Objective:   ROM  *denotes end range pain/symptoms in hand updated 4/25    Cervical AROM (Degrees)    Flexion: 40  Extension: 40  (L) Side Bend: 40*  (R) Side Bend: 30*  (L) Rotation: 50*  (R) Rotation: 80      Shoulder AROM (Degrees) updated 4/25      (R)  (L)  Flexion: 180  180   Abduction: 180  180     Functional ER: C6  C6     Functional IR: T8  T8         Elbow AROM (Degrees)      (R)  (L)  Flexion: 135  135      Extension: 0  0           Strength Testing  Updated 4/25  Shoulder    (R)  (L)  Flexion: 5*  5      Abduction: 5*  5     ER:  5*  5     IR:  5*  5         Elbow    (R)  (L)  Flexion: 5  5       Extension: 5  5         Periscapular Musculature    (R)  (L)  Upper  "Traps: 3  3     Middle Traps: 3  3     Lower Traps: 3  3     Rhomboids: 3  3         Posture: increased thoracic kyphosis, mild rounded shoulders       Palpation: TrP's present in shoulder girdle near TP of thoracic spine      Joint Mobility: hypomobile cervical side glides B, produces pain R       Observation: no obvious discolorations or deformities present.          Special Tests    Cervical    Cervical flexion rotation test: neg  ULTT  Median: pos  Radial: pos  Ulnar: pos  DNF endurance: 10'  Alar ligament: neg  Sharp Skyla: neg    Thoracic Outlet   Adson's Test: pos   Rainer Test: pos   Marvin's: neg       Radicular Symptoms: Yes      Outcome Measures:        Treatments:    Manual Therapy: 30'  IASTM cervical and thoracic paraspinals, UT and scalenes  Trp B UT, scalenes  C0-C7 SP PA glides grade3-4  Cervical side glides B grade 3-4  Cervical up glides R grade 3-4  C0-C7 B TP PA glides grade 3-4    There-ex: 15'  Red TB no monies 2x12  Seated first rib mobs w/ towel and 5\" holds 2x10*  Doorway pec stretch w/ 5\" holds 2x10      [email or web address]  Access Code: [email or web address]    EDUCATION: Home exercise program, plan of care, activity modifications, pain management, and injury pathology       Assessment:   Patient tolerated today's session w/ decreased soreness post session. Demonstrates improvements in cervical and shoulder ROM compared to her initial eval.  Continues to have difficulty w/ hand paresthesia. Patient will benefit from ongoing PT services to continue to progress cervical and thoracic mobility, as well as cervical and scapular strengthening as tolerated to reach established goals to return to PLOF.         Plan:  Continue manual, pec, scalene and SCM stretching emphasis.     Goals: Set and discussed today  Active       TOS/cervical radiculopathy       LTGs (Progressing)       Start:  04/03/25    Expected End:  05/29/25       1. Demonstrate independence with home exercise program  2. " "Tolerate increased exercise without adverse reaction  3. Demonstrate improved posture & proper body mechanics throughout session  4. Increase cervical ROM to pain free + 45 flexion, 45 extension, 45 SB B and 90 R B to improve ADLs.  5. Increase DNF strength by at least 30\" to meet MDC  6. Decrease score of NDI by > 5 points to meet the MCID  7. Perform ADLs without an increase symptoms  8. Demonstrate decreased neural tension by having -ULTT, Rainer and Adson's tests to improve functional mobility  9. Increase gross periscapular and shoulder strength to at least 4/5 to improve functional mobility and ADLs.            Resolved       Concussion       STGs (Met)       Start:  12/09/24    Expected End:  02/04/25    Resolved:  04/03/25    1. Complete oculomotor and vestibular assessments with minimal symptoms and w/o the presence of abnormal eye movements  2. Decrease symptom score to 15 on PCSS to progress towards LTGs.  3. Demonstrate independence with home exercise program  4. Tolerate increased exercise without adverse reaction  5. Demonstrate improved posture & proper body mechanics throughout session         LTGs (Adequate for Discharge)       Start:  12/09/24    Expected End:  03/04/25       1. Progress through the Return to Function/Work protocols without increased symptoms  2. Complete oculomotor and vestibular assessments without increased symptoms or the presence of abnormal eye movements  3. Decrease symptom score to 0 on PCSS  Increase gross ROM to pain free + at least 65 degs flexion, 45 degs extension, 65 degs side bend B, 90 degs R B.  4. Increase deep neck flexor endurance strength by at least 30\" to meet the MDC.  5. Report decrease in pain by > 2 points to meet the MCID  6. Decrease score of NDI by > 5 points to meet the MCID                 Screening  Frequency  Date Last Completed   Spiritual and Cultural Beliefs   Screening each visit or episode of care 9/30/2024   Falls Risk Screening every " ambulatory visit    Pain Screening annually at primary care visit  4/15/2025   Domestic Violence screening annually at primary care visit 9/30/2024   Depression Screening annually in the primary care setting 9/30/2024   Suicide Risk Screening annually in the primary care setting 11/1/2024   Nutrition and Food Insecurity   Screening at least annually at primary care visit     Key Learner annually in the primary care setting 9/30/2024   Drug Screen  9/30/2024 11:39 AM   Alcohol Screen  9/30/2024 11:39 AM   Advance Directive         Plan of care was developed with input and agreement by the patient      Sonido Dwyer, PT, ATC

## 2025-04-28 ENCOUNTER — APPOINTMENT (OUTPATIENT)
Dept: OPHTHALMOLOGY | Facility: CLINIC | Age: 60
End: 2025-04-28
Payer: COMMERCIAL

## 2025-04-28 DIAGNOSIS — H52.03 HYPEROPIA, BILATERAL: Primary | ICD-10-CM

## 2025-04-28 DIAGNOSIS — H52.4 PRESBYOPIA: ICD-10-CM

## 2025-04-28 PROCEDURE — 92015 DETERMINE REFRACTIVE STATE: CPT | Performed by: OPHTHALMOLOGY

## 2025-04-28 PROCEDURE — 92014 COMPRE OPH EXAM EST PT 1/>: CPT | Performed by: OPHTHALMOLOGY

## 2025-04-28 ASSESSMENT — VISUAL ACUITY
METHOD: SNELLEN - LINEAR
OS_CC: 20/20
CORRECTION_TYPE: GLASSES
OD_CC: 20/20

## 2025-04-28 ASSESSMENT — REFRACTION_MANIFEST
OD_ADD: +2.50
OS_ADD: +2.50
OS_AXIS: 005
OS_CYLINDER: -0.50
OD_CYLINDER: SPHERE
OS_SPHERE: +2.25
OD_SPHERE: +2.25

## 2025-04-28 ASSESSMENT — REFRACTION_WEARINGRX
OS_ADD: +2.50
OS_CYLINDER: -0.50
OS_SPHERE: +2.25
OD_SPHERE: +2.25
OS_AXIS: 005
SPECS_TYPE: PAL
OD_ADD: +2.50

## 2025-04-28 ASSESSMENT — ENCOUNTER SYMPTOMS
NEUROLOGICAL NEGATIVE: 1
EYES NEGATIVE: 1

## 2025-04-28 ASSESSMENT — CONF VISUAL FIELD
OD_SUPERIOR_NASAL_RESTRICTION: 0
OD_INFERIOR_TEMPORAL_RESTRICTION: 0
OS_NORMAL: 1
OS_INFERIOR_TEMPORAL_RESTRICTION: 0
OS_SUPERIOR_NASAL_RESTRICTION: 0
OS_INFERIOR_NASAL_RESTRICTION: 0
OD_SUPERIOR_TEMPORAL_RESTRICTION: 0
OD_NORMAL: 1
OS_SUPERIOR_TEMPORAL_RESTRICTION: 0
OD_INFERIOR_NASAL_RESTRICTION: 0

## 2025-04-28 ASSESSMENT — SLIT LAMP EXAM - LIDS
COMMENTS: GOOD POSITION
COMMENTS: GOOD POSITION

## 2025-04-28 ASSESSMENT — CUP TO DISC RATIO
OS_RATIO: .2
OD_RATIO: .2

## 2025-04-28 ASSESSMENT — EXTERNAL EXAM - LEFT EYE: OS_EXAM: NORMAL

## 2025-04-28 ASSESSMENT — EXTERNAL EXAM - RIGHT EYE: OD_EXAM: NORMAL

## 2025-04-28 ASSESSMENT — TONOMETRY
IOP_METHOD: GOLDMANN APPLANATION
OD_IOP_MMHG: 13
OS_IOP_MMHG: 15

## 2025-04-29 ENCOUNTER — TREATMENT (OUTPATIENT)
Dept: PHYSICAL THERAPY | Facility: CLINIC | Age: 60
End: 2025-04-29
Payer: COMMERCIAL

## 2025-04-29 DIAGNOSIS — M47.812 CERVICAL SPONDYLOSIS: ICD-10-CM

## 2025-04-29 DIAGNOSIS — S06.0X0A CONCUSSION WITHOUT LOSS OF CONSCIOUSNESS, INITIAL ENCOUNTER: ICD-10-CM

## 2025-04-29 DIAGNOSIS — M54.12 CERVICAL RADICULOPATHY: ICD-10-CM

## 2025-04-29 DIAGNOSIS — M54.50 ACUTE MIDLINE LOW BACK PAIN WITHOUT SCIATICA: ICD-10-CM

## 2025-04-29 DIAGNOSIS — S06.0X0D CONCUSSION WITH NO LOSS OF CONSCIOUSNESS, SUBSEQUENT ENCOUNTER: ICD-10-CM

## 2025-04-29 DIAGNOSIS — M54.12 RADICULOPATHY, CERVICAL: Primary | ICD-10-CM

## 2025-04-29 DIAGNOSIS — M54.2 NECK PAIN: ICD-10-CM

## 2025-04-29 DIAGNOSIS — G54.0 THORACIC OUTLET SYNDROME: ICD-10-CM

## 2025-04-29 PROCEDURE — 97110 THERAPEUTIC EXERCISES: CPT | Mod: GP

## 2025-04-29 PROCEDURE — RXMED WILLOW AMBULATORY MEDICATION CHARGE

## 2025-04-29 PROCEDURE — 97140 MANUAL THERAPY 1/> REGIONS: CPT | Mod: GP

## 2025-04-29 RX ORDER — TOPIRAMATE 25 MG/1
TABLET ORAL
Qty: 70 TABLET | Refills: 0 | Status: CANCELLED | OUTPATIENT
Start: 2025-04-29

## 2025-04-29 RX ORDER — TOPIRAMATE 50 MG/1
50 TABLET, FILM COATED ORAL 2 TIMES DAILY
Qty: 60 TABLET | Refills: 5 | Status: SHIPPED | OUTPATIENT
Start: 2025-04-29

## 2025-04-29 RX ORDER — CYCLOBENZAPRINE HCL 5 MG
5 TABLET ORAL 3 TIMES DAILY PRN
Qty: 30 TABLET | Refills: 0 | OUTPATIENT
Start: 2025-04-29 | End: 2025-05-09

## 2025-04-29 ASSESSMENT — PAIN - FUNCTIONAL ASSESSMENT: PAIN_FUNCTIONAL_ASSESSMENT: 0-10

## 2025-04-29 ASSESSMENT — PAIN SCALES - GENERAL: PAINLEVEL_OUTOF10: 3

## 2025-04-29 NOTE — PROGRESS NOTES
Physical Therapy  Physical Therapy Treatment    Patient Name: Jessica Jordan  MRN: [id number]  Today's Date: 4/29/2025  Time Calculation  Start Time: 0746  Stop Time: 0826  Time Calculation (min): 40 min      PT Therapeutic Procedures Time Entry  Manual Therapy Time Entry: 20  Therapeutic Exercise Time Entry: 20          Insurance:  Visit number: 19   Authorization info: auth needed  Insurance Type: Payor:  EMPLOYEE MEDICAL PLAN / Plan:  EMPLOYEE [employer]ICAL PLAN [employer] / Product Type: *No Product type* /      Current Problem  1. Radiculopathy, cervical        2. Concussion with no loss of consciousness, subsequent encounter        3. Thoracic outlet syndrome        4. Cervical spondylosis            General:  General  Reason for Referral: Cervical radiculopathy  Referred By: Dr. Lawton    Precautions:  Precautions  Precautions Comment: anxiety, depression  Medical History Form: Reviewed (scanned into chart)    Subjective:   Patient reports she is feeling a little flared up today. Bad days are starting to become more frequent, has an injection scheduled for Friday.     Pain:  Pain Assessment: 0-10  0-10 (Numeric) Pain Score: 3    Objective:   ROM  *denotes end range pain/symptoms in hand updated 4/25    Cervical AROM (Degrees)    Flexion: 40  Extension: 40  (L) Side Bend: 40*  (R) Side Bend: 30*  (L) Rotation: 50*  (R) Rotation: 80      Shoulder AROM (Degrees) updated 4/25      (R)  (L)  Flexion: 180  180   Abduction: 180  180     Functional ER: C6  C6     Functional IR: T8  T8         Elbow AROM (Degrees)      (R)  (L)  Flexion: 135  135      Extension: 0  0           Strength Testing  Updated 4/25  Shoulder    (R)  (L)  Flexion: 5*  5      Abduction: 5*  5     ER:  5*  5     IR:  5*  5         Elbow    (R)  (L)  Flexion: 5  5       Extension: 5  5         Periscapular Musculature    (R)  (L)  Upper Traps: 3  3     Middle Traps: 3  3     Lower Traps: 3  3     Rhomboids: 3  3         Posture: increased  thoracic kyphosis, mild rounded shoulders       Palpation: TrP's present in shoulder girdle near TP of thoracic spine      Joint Mobility: hypomobile cervical side glides B, produces pain R       Observation: no obvious discolorations or deformities present.          Special Tests    Cervical    Cervical flexion rotation test: neg  ULTT  Median: pos  Radial: pos  Ulnar: pos  DNF endurance: 10'  Alar ligament: neg  Sharp Skyla: neg    Thoracic Outlet   Adson's Test: pos   Rainer Test: pos   Marvin's: neg       Radicular Symptoms: Yes      Outcome Measures:        Treatments:    Manual Therapy: 20'  IASTM cervical and thoracic paraspinals, UT and scalenes  Trp B UT, scalenes  Seated first rib mobs     There-ex: 20'  Yellow stroops TB pulldowns 2x12  Yellow stroops rows 2x12*  Cable face pulls w/ 10# 3x12  DB scaption 3x12*  DB shrugs w/ 15# 3x10*    * = added to HEP.  Sheet with exercise descriptions and images issued.  Skilled intervention utilized in the appropriate selection & application of above exercises.  Verbal and tactile cues provided for proper form and technique.  Pt. demonstrated appropriate form & verbalized understanding of optimal technique for above exercises.        [email or web address]  Access Code: [email or web address]    EDUCATION: Home exercise program, plan of care, activity modifications, pain management, and injury pathology       Assessment:   Patient tolerated today's session w/ expected muscle fatigue. Demonstrates improvements in activity tolerance displayed through increased there-ex volume w/o symptom aggravation Had difficulty w/ face pulls due to fatigue. Patient will benefit from ongoing PT services to continue to progress cervical and scapular strengthening, as well as thoracic and cervical mobility to reach established goals to return to PLOF.        Plan:  Continue manual, heavy scapular strengthening focus: Y's and T's, no monies, prone rows and cervical isotonics w/  "bands.     Goals: Set and discussed today  Active       TOS/cervical radiculopathy       LTGs (Progressing)       Start:  04/03/25    Expected End:  05/29/25       1. Demonstrate independence with home exercise program  2. Tolerate increased exercise without adverse reaction  3. Demonstrate improved posture & proper body mechanics throughout session  4. Increase cervical ROM to pain free + 45 flexion, 45 extension, 45 SB B and 90 R B to improve ADLs.  5. Increase DNF strength by at least 30\" to meet MDC  6. Decrease score of NDI by > 5 points to meet the MCID  7. Perform ADLs without an increase symptoms  8. Demonstrate decreased neural tension by having -ULTT, Raienr and Adson's tests to improve functional mobility  9. Increase gross periscapular and shoulder strength to at least 4/5 to improve functional mobility and ADLs.            Resolved       Concussion       STGs (Met)       Start:  12/09/24    Expected End:  02/04/25    Resolved:  04/03/25    1. Complete oculomotor and vestibular assessments with minimal symptoms and w/o the presence of abnormal eye movements  2. Decrease symptom score to 15 on PCSS to progress towards LTGs.  3. Demonstrate independence with home exercise program  4. Tolerate increased exercise without adverse reaction  5. Demonstrate improved posture & proper body mechanics throughout session         LTGs (Adequate for Discharge)       Start:  12/09/24    Expected End:  03/04/25       1. Progress through the Return to Function/Work protocols without increased symptoms  2. Complete oculomotor and vestibular assessments without increased symptoms or the presence of abnormal eye movements  3. Decrease symptom score to 0 on PCSS  Increase gross ROM to pain free + at least 65 degs flexion, 45 degs extension, 65 degs side bend B, 90 degs R B.  4. Increase deep neck flexor endurance strength by at least 30\" to meet the MDC.  5. Report decrease in pain by > 2 points to meet the MCID  6. Decrease " score of NDI by > 5 points to meet the MCID                 Screening  Frequency  Date Last Completed   Spiritual and Cultural Beliefs   Screening each visit or episode of care 9/30/2024   Falls Risk Screening every ambulatory visit    Pain Screening annually at primary care visit  4/15/2025   Domestic Violence screening annually at primary care visit 9/30/2024   Depression Screening annually in the primary care setting 9/30/2024   Suicide Risk Screening annually in the primary care setting 11/1/2024   Nutrition and Food Insecurity   Screening at least annually at primary care visit     Key Learner annually in the primary care setting 9/30/2024   Drug Screen  9/30/2024 11:39 AM   Alcohol Screen  9/30/2024 11:39 AM   Advance Directive         Plan of care was developed with input and agreement by the patient      Sonido Dwyer, PT, ATC

## 2025-04-30 ENCOUNTER — PHARMACY VISIT (OUTPATIENT)
Dept: PHARMACY | Facility: CLINIC | Age: 60
End: 2025-04-30
Payer: COMMERCIAL

## 2025-05-02 ENCOUNTER — APPOINTMENT (OUTPATIENT)
Dept: PHYSICAL THERAPY | Facility: CLINIC | Age: 60
End: 2025-05-02
Payer: COMMERCIAL

## 2025-05-02 ENCOUNTER — HOSPITAL ENCOUNTER (OUTPATIENT)
Facility: HOSPITAL | Age: 60
Discharge: HOME | End: 2025-05-02
Payer: COMMERCIAL

## 2025-05-02 VITALS
RESPIRATION RATE: 16 BRPM | HEART RATE: 71 BPM | DIASTOLIC BLOOD PRESSURE: 85 MMHG | TEMPERATURE: 98.4 F | OXYGEN SATURATION: 98 % | SYSTOLIC BLOOD PRESSURE: 138 MMHG

## 2025-05-02 DIAGNOSIS — S06.0X0D CONCUSSION WITH NO LOSS OF CONSCIOUSNESS, SUBSEQUENT ENCOUNTER: Primary | ICD-10-CM

## 2025-05-02 DIAGNOSIS — M54.12 CERVICAL RADICULOPATHY: ICD-10-CM

## 2025-05-02 PROCEDURE — 62321 NJX INTERLAMINAR CRV/THRC: CPT | Performed by: ANESTHESIOLOGY

## 2025-05-02 PROCEDURE — 2500000004 HC RX 250 GENERAL PHARMACY W/ HCPCS (ALT 636 FOR OP/ED): Mod: JZ | Performed by: ANESTHESIOLOGY

## 2025-05-02 PROCEDURE — 2550000001 HC RX 255 CONTRASTS: Performed by: ANESTHESIOLOGY

## 2025-05-02 RX ORDER — LIDOCAINE HYDROCHLORIDE 5 MG/ML
INJECTION, SOLUTION INFILTRATION; INTRAVENOUS
Status: COMPLETED | OUTPATIENT
Start: 2025-05-02 | End: 2025-05-02

## 2025-05-02 RX ORDER — METHYLPREDNISOLONE ACETATE 40 MG/ML
INJECTION, SUSPENSION INTRA-ARTICULAR; INTRALESIONAL; INTRAMUSCULAR; SOFT TISSUE
Status: COMPLETED | OUTPATIENT
Start: 2025-05-02 | End: 2025-05-02

## 2025-05-02 RX ADMIN — METHYLPREDNISOLONE ACETATE 40 MG: 40 INJECTION, SUSPENSION INTRA-ARTICULAR; INTRALESIONAL; INTRAMUSCULAR; SOFT TISSUE at 12:59

## 2025-05-02 RX ADMIN — LIDOCAINE HYDROCHLORIDE 6 ML: 5 INJECTION, SOLUTION INFILTRATION at 12:59

## 2025-05-02 RX ADMIN — IOHEXOL 2 ML: 240 INJECTION, SOLUTION INTRATHECAL; INTRAVASCULAR; INTRAVENOUS; ORAL at 12:59

## 2025-05-02 ASSESSMENT — PAIN - FUNCTIONAL ASSESSMENT
PAIN_FUNCTIONAL_ASSESSMENT: WONG-BAKER FACES
PAIN_FUNCTIONAL_ASSESSMENT: 0-10
PAIN_FUNCTIONAL_ASSESSMENT: 0-10
PAIN_FUNCTIONAL_ASSESSMENT: WONG-BAKER FACES

## 2025-05-02 ASSESSMENT — COLUMBIA-SUICIDE SEVERITY RATING SCALE - C-SSRS
1. IN THE PAST MONTH, HAVE YOU WISHED YOU WERE DEAD OR WISHED YOU COULD GO TO SLEEP AND NOT WAKE UP?: NO
6. HAVE YOU EVER DONE ANYTHING, STARTED TO DO ANYTHING, OR PREPARED TO DO ANYTHING TO END YOUR LIFE?: NO
2. HAVE YOU ACTUALLY HAD ANY THOUGHTS OF KILLING YOURSELF?: NO

## 2025-05-02 ASSESSMENT — PAIN SCALES - GENERAL
PAINLEVEL_OUTOF10: 1
PAINLEVEL_OUTOF10: 3
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
## 2025-05-02 NOTE — DISCHARGE INSTRUCTIONS
DISCHARGE INSTRUCTIONS FOR INJECTIONS       After most injections, it is recommended that you relax and limit your activity for the remainder of the day unless you have been told otherwise by your pain physician.  You should not drive a car, operate machinery, or make important legal decisions unless otherwise directed by your pain physician.  You may resume your normal activity, including exercise, tomorrow.      Keep a written pain diary of how much pain relief you experienced following the injection procedure and the length of time of pain relief you experienced pain relief. Following diagnostic injections like medial branch nerve blocks, sacroiliac joint blocks, stellate ganglion injections and other blocks, it is very important you record the specific amount of pain relief you experienced immediately after the injectionand how long it lasted. Your doctor will ask you for this information at your follow up visit.     For all injections, please keep the injection site dry and inspect the site for a couple of days. You may remove the Band-Aid the day of the injection at any time.     Some discomfort, bruising or slight swelling may occur at the injection site. This is not abnormal if it occurs.  If needed you may:    -Take over the counter medication such as Tylenol or Motrin.   -Apply an ice pack for 30 minutes, 2 to 3 times a day for the first 24 hours.     You may shower today; no soaking baths, hot tubs, whirlpools or swimming pools for two days.      If you are given steroids in your injection, it may take 3-5 days for the steroid medication to take effect. You may notice a worsening of your symptoms for 1-2 days after the injection. This is not abnormal.  You may use acetaminophen, ibuprofen, or prescription medication that your doctor may have prescribed for you if you need to do so.     A few common side effects of steroids include facial flushing, sweating, restlessness, irritability,difficulty sleeping,  increase in blood sugar, and increased blood pressure. If you have diabetes, please monitor your blood sugar at least once a day for at least 5 days. If you have poorly controlled high blood pressure, monitoryour blood pressure for at least 2 days and contact your primary care physician if these numbers are unusually high for you.      If you take aspirin or non-steroidal anti-inflammatory drugs (examples are Motrin, Advil, ibuprofen, Naprosyn, Voltaren, Relafen, etc.) you may restart these this evening, but stop taking it 3 days before your next appointment, unless instructed otherwiseby your physician.      You do not need to discontinue non-aspirin-containing pain medications prior to an injection (examples: Celebrex, tramadol, hydrocodone and acetaminophen).      If you take a blood thinning medication (Coumadin, Lovenox, Fragmin,Ticlid, Plavix, Pradaxa, etc.), please discuss this with your primary care physician/cardiologist and your pain physician. These medications MUST be discontinued before you can have an injection safely, without the risk of uncontrolled bleeding. If these medications are not discontinued for an appropriate period of time, you will not be able to receivean injection. Please adhere to instructions given to you about when to restart your blood thinning medication. If you have any questions please reach out to our team.    If you are taking Coumadin, please have your INR checked the morning of your procedure and bring the result to your appointment unless otherwise instructed. If your INR is over 1.2, your injection may need to be rescheduled to avoid uncontrolled bleeding from the needle placement.     Call [facility]  and ask for Pain Management at [phone] between 8am-4pm Monday - Friday if you are experiencing the following:    If you received an epidural or spinal injection:    -Headache that doesnot go away with medicine, is worse when sitting or standing up, and is greatly  relieved upon lying down.   -Severe pain worse than or different than your baseline pain.   -Chills or fever (101º F or greater).   -Drainage or signs of infection at the injection site     Go directly to the Emergency Department if you are experiencing the following and received an epidural or spinal injection:   -Abrupt weakness or progressive weakness in your legs that starts after you leave the clinic.   -Abrupt severe or worsening numbness in your legs.   -Inability to urinate after the injection or loss of bowel or bladder control without the urge to defecate or urinate.     If you have a clinical question that cannot wait until your next appointment, please call [phone] between 8am-4pm Monday - Friday or send a [employer]OnYou message. We do our best to return all non-emergency messages within 24 hours, Monday - Friday. A nurse or physician will return your message. You may also try calling and they will do their best to answer your question(s):  - Dr. Driss Still's nurse ([phone])    If you need to cancel an appointment, please call the scheduling staff at [phone] during normal business hours or leave a message at least 24 hours in advance.     If you are going to be sedated for your next procedure, you MUST have responsible adult who can legally drive accompany you home. You cannot eat or drink for at least eight hours prior to the planned procedure if you are going to receive sedation. You may take your non-blood thinning medications with a small sip of water.

## 2025-05-09 ENCOUNTER — APPOINTMENT (OUTPATIENT)
Dept: ORTHOPEDIC SURGERY | Facility: CLINIC | Age: 60
End: 2025-05-09
Payer: COMMERCIAL

## 2025-05-12 DIAGNOSIS — M54.2 NECK PAIN: ICD-10-CM

## 2025-05-12 DIAGNOSIS — M54.50 ACUTE MIDLINE LOW BACK PAIN WITHOUT SCIATICA: ICD-10-CM

## 2025-05-12 DIAGNOSIS — F32.A DEPRESSION, UNSPECIFIED DEPRESSION TYPE: ICD-10-CM

## 2025-05-12 DIAGNOSIS — S06.0X0A CONCUSSION WITHOUT LOSS OF CONSCIOUSNESS, INITIAL ENCOUNTER: ICD-10-CM

## 2025-05-13 RX ORDER — CYCLOBENZAPRINE HCL 5 MG
5 TABLET ORAL 3 TIMES DAILY PRN
Qty: 30 TABLET | Refills: 0 | OUTPATIENT
Start: 2025-05-13 | End: 2025-05-23

## 2025-05-14 ENCOUNTER — TREATMENT (OUTPATIENT)
Dept: PHYSICAL THERAPY | Facility: CLINIC | Age: 60
End: 2025-05-14
Payer: COMMERCIAL

## 2025-05-14 DIAGNOSIS — G54.0 THORACIC OUTLET SYNDROME: ICD-10-CM

## 2025-05-14 DIAGNOSIS — M54.50 ACUTE MIDLINE LOW BACK PAIN WITHOUT SCIATICA: ICD-10-CM

## 2025-05-14 DIAGNOSIS — S06.0X0D CONCUSSION WITH NO LOSS OF CONSCIOUSNESS, SUBSEQUENT ENCOUNTER: Primary | ICD-10-CM

## 2025-05-14 DIAGNOSIS — M54.12 RADICULOPATHY, CERVICAL: ICD-10-CM

## 2025-05-14 DIAGNOSIS — M47.812 CERVICAL SPONDYLOSIS: ICD-10-CM

## 2025-05-14 DIAGNOSIS — M54.2 NECK PAIN: ICD-10-CM

## 2025-05-14 DIAGNOSIS — S06.0X0A CONCUSSION WITHOUT LOSS OF CONSCIOUSNESS, INITIAL ENCOUNTER: ICD-10-CM

## 2025-05-14 PROCEDURE — 97140 MANUAL THERAPY 1/> REGIONS: CPT | Mod: GP

## 2025-05-14 PROCEDURE — 97110 THERAPEUTIC EXERCISES: CPT | Mod: GP

## 2025-05-14 RX ORDER — SERTRALINE HYDROCHLORIDE 50 MG/1
75 TABLET, FILM COATED ORAL DAILY
Qty: 135 TABLET | Refills: 3 | Status: SHIPPED | OUTPATIENT
Start: 2025-05-14 | End: 2026-05-14

## 2025-05-14 ASSESSMENT — PAIN - FUNCTIONAL ASSESSMENT: PAIN_FUNCTIONAL_ASSESSMENT: 0-10

## 2025-05-14 ASSESSMENT — PAIN SCALES - GENERAL: PAINLEVEL_OUTOF10: 2

## 2025-05-14 NOTE — PROGRESS NOTES
Physical Therapy  Physical Therapy Treatment    Patient Name: Jessica Jordan  MRN: [id number]  Today's Date: 5/14/2025  Time Calculation  Start Time: 1710  Stop Time: 1740  Time Calculation (min): 30 min      PT Therapeutic Procedures Time Entry  Manual Therapy Time Entry: 15  Therapeutic Exercise Time Entry: 15          Insurance:  Visit number: 17 of 23   Authorization info: auth needed  Insurance Type: Payor:  EMPLOYEE MEDICAL PLAN / Plan:  EMPLOYEE [employer]ICAL [employer]AN [employer] / Product Type: *No Product type* /      Current Problem  1. Concussion with no loss of consciousness, subsequent encounter  Follow Up In Physical Therapy      2. Thoracic outlet syndrome  Follow Up In Physical Therapy      3. Radiculopathy, cervical  Follow Up In Physical Therapy      4. Cervical spondylosis  Follow Up In Physical Therapy          General:  General  Reason for Referral: Cervical radiculopathy  Referred By: Dr. Lawton    Precautions:  Precautions  Precautions Comment: anxiety, depression  Medical History Form: Reviewed (scanned into chart)    Subjective:   Patient reports she is feeling much better since her injection! Still has numbness but the tingling sensation has improved a lot. Neck pain and headaches have returned but are much less severe 2-3/10. Got stuck in  parking garage leaving work, arrived 20 mins late.     Pain:  Pain Assessment: 0-10  0-10 (Numeric) Pain Score: 2    Objective:   ROM  *denotes end range pain/symptoms in hand updated 4/25    Cervical AROM (Degrees)    Flexion: 40  Extension: 40  (L) Side Bend: 40*  (R) Side Bend: 30*  (L) Rotation: 50*  (R) Rotation: 80      Shoulder AROM (Degrees) updated 4/25      (R)  (L)  Flexion: 180  180   Abduction: 180  180     Functional ER: C6  C6     Functional IR: T8  T8         Elbow AROM (Degrees)      (R)  (L)  Flexion: 135  135      Extension: 0  0           Strength Testing  Updated  "4/25  Shoulder    (R)  (L)  Flexion: 5*  5      Abduction: 5*  5     ER:  5*  5     IR:  5*  5         Elbow    (R)  (L)  Flexion: 5  5       Extension: 5  5         Periscapular Musculature    (R)  (L)  Upper Traps: 3  3     Middle Traps: 3  3     Lower Traps: 3  3     Rhomboids: 3  3         Posture: increased thoracic kyphosis, mild rounded shoulders       Palpation: TrP's present in shoulder girdle near TP of thoracic spine      Joint Mobility: hypomobile cervical side glides B, produces pain R       Observation: no obvious discolorations or deformities present.          Special Tests    Cervical    Cervical flexion rotation test: neg  ULTT  Median: pos  Radial: pos  Ulnar: pos  DNF endurance: 10'  Alar ligament: neg  Sharp Skyla: neg    Thoracic Outlet   Adson's Test: pos   Rainer Test: pos   Marvin's: neg       Radicular Symptoms: Yes      Outcome Measures:        Treatments:    Manual Therapy: 15'  Trp/STM B UT, scalenes  Supine C0-C7 PA glides grade 2-3  Supine C0-C7 TP PA glides grade 2-3  Cervical side glides grade 2-3 B  Cervical upglides B grade 2-3  UT release B    There-ex: 15'  Self SNAGS extensions w/ 3-4\" holds x10*  Self SNAGS R B w/ 3-4\" holds x10 B*  Self SNAGS side bend B w/ 3-4\" holds x10 B*  First rib mobs w/ 3-4\" holds x10 B*    * = added to HEP.  Sheet with exercise descriptions and images issued.  Skilled intervention utilized in the appropriate selection & application of above exercises.  Verbal and tactile cues provided for proper form and technique.  Pt. demonstrated appropriate form & verbalized understanding of optimal technique for above exercises.        [email or web address] [email or web address]/  Access Code: [email or web address]    EDUCATION: Home exercise program, plan of care, activity modifications, pain management, and injury pathology       Assessment:   Patient tolerated today's session w/ a reported reduction in neck pain and headache symptoms. Abbreviated session due to late arrival " "time. Patient reports improvements in symptoms of paresthesia and tingling since injection. Neck pain and headaches are lingering symptoms and will be focus of next visit. Responded well to review os SNAGS. Patient will benefit from ongoing PT services to continue to progress cervical and thoracic mobility, as well as cervical and scapular strengthening to reach established goals to maximize functional mobility.     Plan:  Resume previous plan: Continue manual, heavy scapular strengthening focus: Y's and T's, no monies, prone rows and cervical isotonics w/ bands. Recheck and update goals.     Goals: Set and discussed today  Active       TOS/cervical radiculopathy       LTGs (Progressing)       Start:  04/03/25    Expected End:  05/29/25       1. Demonstrate independence with home exercise program  2. Tolerate increased exercise without adverse reaction  3. Demonstrate improved posture & proper body mechanics throughout session  4. Increase cervical ROM to pain free + 45 flexion, 45 extension, 45 SB B and 90 R B to improve ADLs.  5. Increase DNF strength by at least 30\" to meet MDC  6. Decrease score of NDI by > 5 points to meet the MCID  7. Perform ADLs without an increase symptoms  8. Demonstrate decreased neural tension by having -ULTT, Rainer and Adson's tests to improve functional mobility  9. Increase gross periscapular and shoulder strength to at least 4/5 to improve functional mobility and ADLs.            Resolved       Concussion       STGs (Met)       Start:  12/09/24    Expected End:  02/04/25    Resolved:  04/03/25    1. Complete oculomotor and vestibular assessments with minimal symptoms and w/o the presence of abnormal eye movements  2. Decrease symptom score to 15 on PCSS to progress towards LTGs.  3. Demonstrate independence with home exercise program  4. Tolerate increased exercise without adverse reaction  5. Demonstrate improved posture & proper body mechanics throughout session         LTGs " "(Adequate for Discharge)       Start:  12/09/24    Expected End:  03/04/25       1. Progress through the Return to Function/Work protocols without increased symptoms  2. Complete oculomotor and vestibular assessments without increased symptoms or the presence of abnormal eye movements  3. Decrease symptom score to 0 on PCSS  Increase gross ROM to pain free + at least 65 degs flexion, 45 degs extension, 65 degs side bend B, 90 degs R B.  4. Increase deep neck flexor endurance strength by at least 30\" to meet the MDC.  5. Report decrease in pain by > 2 points to meet the MCID  6. Decrease score of NDI by > 5 points to meet the MCID                 Screening  Frequency  Date Last Completed   Spiritual and Cultural Beliefs   Screening each visit or episode of care 9/30/2024   Falls Risk Screening every ambulatory visit    Pain Screening annually at primary care visit  4/15/2025   Domestic Violence screening annually at primary care visit 9/30/2024   Depression Screening annually in the primary care setting 9/30/2024   Suicide Risk Screening annually in the primary care setting 5/2/2025   Nutrition and Food Insecurity   Screening at least annually at primary care visit     Key Learner annually in the primary care setting 9/30/2024   Drug Screen  5/2/2025 12:05 PM   Alcohol Screen  5/2/2025 12:05 PM   Advance Directive         Plan of care was developed with input and agreement by the patient      Sonido Dwyer, PT, ATC    "

## 2025-05-15 RX ORDER — CYCLOBENZAPRINE HCL 5 MG
5 TABLET ORAL 3 TIMES DAILY PRN
Qty: 30 TABLET | Refills: 0 | OUTPATIENT
Start: 2025-05-15 | End: 2025-05-25

## 2025-05-21 DIAGNOSIS — M54.50 ACUTE MIDLINE LOW BACK PAIN WITHOUT SCIATICA: ICD-10-CM

## 2025-05-21 DIAGNOSIS — S06.0X0A CONCUSSION WITHOUT LOSS OF CONSCIOUSNESS, INITIAL ENCOUNTER: ICD-10-CM

## 2025-05-21 DIAGNOSIS — M54.2 NECK PAIN: ICD-10-CM

## 2025-05-22 RX ORDER — CYCLOBENZAPRINE HCL 5 MG
5 TABLET ORAL 3 TIMES DAILY PRN
Qty: 30 TABLET | Refills: 0 | OUTPATIENT
Start: 2025-05-22 | End: 2025-06-01

## 2025-05-23 ENCOUNTER — TREATMENT (OUTPATIENT)
Dept: PHYSICAL THERAPY | Facility: CLINIC | Age: 60
End: 2025-05-23
Payer: COMMERCIAL

## 2025-05-23 DIAGNOSIS — M54.2 NECK PAIN: ICD-10-CM

## 2025-05-23 DIAGNOSIS — G54.0 THORACIC OUTLET SYNDROME: ICD-10-CM

## 2025-05-23 DIAGNOSIS — M54.50 ACUTE MIDLINE LOW BACK PAIN WITHOUT SCIATICA: ICD-10-CM

## 2025-05-23 DIAGNOSIS — S06.0X0D CONCUSSION WITH NO LOSS OF CONSCIOUSNESS, SUBSEQUENT ENCOUNTER: ICD-10-CM

## 2025-05-23 DIAGNOSIS — M54.12 RADICULOPATHY, CERVICAL: Primary | ICD-10-CM

## 2025-05-23 DIAGNOSIS — M47.812 CERVICAL SPONDYLOSIS: ICD-10-CM

## 2025-05-23 DIAGNOSIS — S06.0X0A CONCUSSION WITHOUT LOSS OF CONSCIOUSNESS, INITIAL ENCOUNTER: ICD-10-CM

## 2025-05-23 PROCEDURE — RXMED WILLOW AMBULATORY MEDICATION CHARGE

## 2025-05-23 PROCEDURE — 97140 MANUAL THERAPY 1/> REGIONS: CPT | Mod: GP

## 2025-05-23 PROCEDURE — 97110 THERAPEUTIC EXERCISES: CPT | Mod: GP

## 2025-05-23 ASSESSMENT — PAIN - FUNCTIONAL ASSESSMENT: PAIN_FUNCTIONAL_ASSESSMENT: 0-10

## 2025-05-23 ASSESSMENT — PAIN SCALES - GENERAL: PAINLEVEL_OUTOF10: 2

## 2025-05-23 NOTE — PROGRESS NOTES
"  Physical Therapy  Physical Therapy Treatment    Patient Name: Jessica Jordan  MRN: [id number]  Today's Date: 5/23/2025  Time Calculation  Start Time: 0832  Stop Time: 0915  Time Calculation (min): 43 min      PT Therapeutic Procedures Time Entry  Manual Therapy Time Entry: 20  Therapeutic Exercise Time Entry: 23          Insurance:  Visit number: 18 of 23   Authorization info: auth needed  Insurance Type: Payor:  EMPLOYEE MEDICAL PLAN / Plan:  EMPLOYEE [employer]ICAL PLAN [employer] / Product Type: *No Product type* /      Current Problem  1. Radiculopathy, cervical        2. Concussion with no loss of consciousness, subsequent encounter  Follow Up In Physical Therapy      3. Cervical spondylosis        4. Thoracic outlet syndrome            General:  General  Reason for Referral: Cervical radiculopathy  Referred By: Dr. Lawton    Precautions:  Precautions  Precautions Comment: anxiety, depression  Medical History Form: Reviewed (scanned into chart)    Subjective:   Patient reports she is continuing to feel better since her injection. Symptoms are still present but less frequent and intense. \"I feel I am getting 100% better\".     Pain:  Pain Assessment: 0-10  0-10 (Numeric) Pain Score: 2    Objective:   ROM  *denotes end range pain/symptoms in hand updated 4/25    Cervical AROM (Degrees) updated 5/23    Flexion: 40  Extension: 40  (L) Side Bend: 40*  (R) Side Bend: 30*  (L) Rotation: 70*  (R) Rotation: 80      Shoulder AROM (Degrees) updated 4/25      (R)  (L)  Flexion: 180  180   Abduction: 180  180     Functional ER: C6  C6     Functional IR: T8  T8         Elbow AROM (Degrees)      (R)  (L)  Flexion: 135  135      Extension: 0  0           Strength Testing  Updated 5/23 HHD flexion and ER only   Shoulder    (R)  (L)  Flexion: 4.3  10.3      Abduction: 5*  5     ER:  7.2  10.5     IR:  5*  5         Elbow    (R)  (L)  Flexion: 5  5       Extension: 5  5         Periscapular Musculature    (R)  (L)  Upper " Traps: 3  3     Middle Traps: 3  3     Lower Traps: 3  3     Rhomboids: 3  3         Posture: increased thoracic kyphosis, mild rounded shoulders       Palpation: TrP's present in shoulder girdle near TP of thoracic spine      Joint Mobility: hypomobile cervical side glides B, produces pain R       Observation: no obvious discolorations or deformities present.          Special Tests    Cervical    Cervical flexion rotation test: neg  ULTT  Median: pos  Radial: pos  Ulnar: pos  DNF endurance: 10'  Alar ligament: neg  Sharp Skyla: neg    Thoracic Outlet   Adson's Test: pos   Rainer Test: pos   Marvin's: neg       Radicular Symptoms: Yes      Outcome Measures:        Treatments:    Manual Therapy: 20'  Trp/STM B scalenes, SCM  Supine C0-C7 PA glides grade 2-3  Supine C0-C7 TP PA glides grade 2-3  Cervical side glides grade 2-3 B  Cervical upglides B grade 2-3    There-ex: 23'  UBE 3' fwd, 3' bwd  Prone T raises w/ 2# x12  Standing T raises w/ red stroops TB 2x12 B  Yellow stroops TB D2 flexion 2x12 B  Red TB no monies 3x12*    * = added to HEP.  Sheet with exercise descriptions and images issued.  Skilled intervention utilized in the appropriate selection & application of above exercises.  Verbal and tactile cues provided for proper form and technique.  Pt. demonstrated appropriate form & verbalized understanding of optimal technique for above exercises.        [email or web address]  Access Code: [email or web address]    EDUCATION: Home exercise program, plan of care, activity modifications, pain management, and injury pathology       Assessment:   Patient tolerated today's session w/ expected muscle fatigue and decreased stiffness post session. Demonstrates improvements in neck ROM, UE strength and activity tolerance displayed throughout session. Had difficulty w/ prone T raises due to paresthesia which resolved when exercise was modified to a standing position. Patient is progressing and will benefit from  "ongoing PT services to continue to progress cervical mobility, scapular, cervical and cuff strengthening as tolerated to reach established goals to return to PLOF.       Plan:  Continue manual, heavy scapular strengthening focus: Cervical isotonics w/ band, ER/IR at side, face pulls, scaption ER/IR.     Goals: Set and discussed today  Active       TOS/cervical radiculopathy       LTGs (Progressing)       Start:  04/03/25    Expected End:  06/13/25       1. Demonstrate independence with home exercise program  2. Tolerate increased exercise without adverse reaction  3. Demonstrate improved posture & proper body mechanics throughout session  4. Increase cervical ROM to pain free + 45 flexion, 45 extension, 45 SB B and 90 R B to improve ADLs.  5. Increase DNF strength by at least 30\" to meet MDC  6. Decrease score of NDI by > 5 points to meet the MCID  7. Perform ADLs without an increase symptoms  8. Demonstrate decreased neural tension by having -ULTT, Rainer and Adson's tests to improve functional mobility  9. Increase gross periscapular and shoulder strength to at least 4/5 to improve functional mobility and ADLs.            Resolved       Concussion       STGs (Met)       Start:  12/09/24    Expected End:  02/04/25    Resolved:  04/03/25    1. Complete oculomotor and vestibular assessments with minimal symptoms and w/o the presence of abnormal eye movements  2. Decrease symptom score to 15 on PCSS to progress towards LTGs.  3. Demonstrate independence with home exercise program  4. Tolerate increased exercise without adverse reaction  5. Demonstrate improved posture & proper body mechanics throughout session         LTGs (Adequate for Discharge)       Start:  12/09/24    Expected End:  03/04/25       1. Progress through the Return to Function/Work protocols without increased symptoms  2. Complete oculomotor and vestibular assessments without increased symptoms or the presence of abnormal eye movements  3. Decrease " "symptom score to 0 on PCSS  Increase gross ROM to pain free + at least 65 degs flexion, 45 degs extension, 65 degs side bend B, 90 degs R B.  4. Increase deep neck flexor endurance strength by at least 30\" to meet the MDC.  5. Report decrease in pain by > 2 points to meet the MCID  6. Decrease score of NDI by > 5 points to meet the MCID                 Screening  Frequency  Date Last Completed   Spiritual and Cultural Beliefs   Screening each visit or episode of care 9/30/2024   Falls Risk Screening every ambulatory visit    Pain Screening annually at primary care visit  4/15/2025   Domestic Violence screening annually at primary care visit 9/30/2024   Depression Screening annually in the primary care setting 9/30/2024   Suicide Risk Screening annually in the primary care setting 5/2/2025   Nutrition and Food Insecurity   Screening at least annually at primary care visit     Key Learner annually in the primary care setting 9/30/2024   Drug Screen  5/2/2025 12:05 PM   Alcohol Screen  5/2/2025 12:05 PM   Advance Directive         Plan of care was developed with input and agreement by the patient      Sonido Dwyer, PT, ATC    "

## 2025-05-27 ENCOUNTER — APPOINTMENT (OUTPATIENT)
Dept: NEUROLOGY | Facility: CLINIC | Age: 60
End: 2025-05-27
Payer: COMMERCIAL

## 2025-05-27 ENCOUNTER — PHARMACY VISIT (OUTPATIENT)
Dept: PHARMACY | Facility: CLINIC | Age: 60
End: 2025-05-27
Payer: COMMERCIAL

## 2025-05-28 ENCOUNTER — TREATMENT (OUTPATIENT)
Dept: PHYSICAL THERAPY | Facility: CLINIC | Age: 60
End: 2025-05-28
Payer: COMMERCIAL

## 2025-05-28 DIAGNOSIS — G54.0 THORACIC OUTLET SYNDROME: ICD-10-CM

## 2025-05-28 DIAGNOSIS — M54.12 RADICULOPATHY, CERVICAL: ICD-10-CM

## 2025-05-28 DIAGNOSIS — M47.812 CERVICAL SPONDYLOSIS: ICD-10-CM

## 2025-05-28 DIAGNOSIS — S06.0X0D CONCUSSION WITH NO LOSS OF CONSCIOUSNESS, SUBSEQUENT ENCOUNTER: Primary | ICD-10-CM

## 2025-05-28 PROCEDURE — 97110 THERAPEUTIC EXERCISES: CPT | Mod: GP

## 2025-05-28 PROCEDURE — 97140 MANUAL THERAPY 1/> REGIONS: CPT | Mod: GP

## 2025-05-28 ASSESSMENT — PAIN SCALES - GENERAL: PAINLEVEL_OUTOF10: 1

## 2025-05-28 ASSESSMENT — PAIN - FUNCTIONAL ASSESSMENT: PAIN_FUNCTIONAL_ASSESSMENT: 0-10

## 2025-05-28 NOTE — PROGRESS NOTES
Physical Therapy  Physical Therapy Treatment    Patient Name: Jessica Jordan  MRN: [id number]  Today's Date: 5/28/2025  Time Calculation  Start Time: 1645  Stop Time: 1730  Time Calculation (min): 45 min      PT Therapeutic Procedures Time Entry  Manual Therapy Time Entry: 25  Therapeutic Exercise Time Entry: 20          Insurance:  Visit number: 19 of 23   Authorization info: auth needed  Insurance Type: Payor:  EMPLOYEE MEDICAL PLAN / Plan:  EMPLOYEE MEDICAL PLAN [employer] / Product Type: *No Product type* /      Current Problem  1. Concussion with no loss of consciousness, subsequent encounter        2. Thoracic outlet syndrome  Follow Up In Physical Therapy      3. Radiculopathy, cervical  Follow Up In Physical Therapy      4. Cervical spondylosis  Follow Up In Physical Therapy          General:  General  Reason for Referral: Cervical radiculopathy  Referred By: Dr. Lawton    Precautions:  Precautions  Precautions Comment: anxiety, depression  Medical History Form: Reviewed (scanned into chart)    Subjective:   Patient reports she is continuing to feel better each week. No increased symptoms after last session. Some muscle soreness entering clinic today from yard work done over the weekend. Feels med and PT combo are both kicking in and working.    Pain:  Pain Assessment: 0-10  0-10 (Numeric) Pain Score: 1    Objective:   ROM  *denotes end range pain/symptoms in hand updated 4/25    Cervical AROM (Degrees) updated 5/23    Flexion: 40  Extension: 40  (L) Side Bend: 40*  (R) Side Bend: 30*  (L) Rotation: 70*  (R) Rotation: 80      Shoulder AROM (Degrees) updated 4/25      (R)  (L)  Flexion: 180  180   Abduction: 180  180     Functional ER: C6  C6     Functional IR: T8  T8         Elbow AROM (Degrees)      (R)  (L)  Flexion: 135  135      Extension: 0  0           Strength Testing  Updated 5/23 HHD flexion and ER only    Shoulder    (R)  (L)  Flexion: 4.3  10.3      Abduction: 5*  5     ER:  7.2  10.5     IR:  5*  5         Elbow    (R)  (L)  Flexion: 5  5       Extension: 5  5         Periscapular Musculature    (R)  (L)  Upper Traps: 3  3     Middle Traps: 3  3     Lower Traps: 3  3     Rhomboids: 3  3         Posture: increased thoracic kyphosis, mild rounded shoulders       Palpation: TrP's present in shoulder girdle near TP of thoracic spine      Joint Mobility: hypomobile cervical side glides B, produces pain R       Observation: no obvious discolorations or deformities present.          Special Tests    Cervical    Cervical flexion rotation test: neg  ULTT  Median: pos  Radial: pos  Ulnar: pos  DNF endurance: 10'  Alar ligament: neg  Sharp Skyla: neg    Thoracic Outlet   Adson's Test: pos   Rainer Test: pos   Marvin's: neg       Radicular Symptoms: Yes      Outcome Measures:        Treatments:    Manual Therapy: 25'  Trp/STM B scalenes, SCM  Supine C0-C7 PA glides grade 2-3  Supine C0-C7 TP PA glides grade 2-3  Cervical side glides grade 2-3 B  B lower cervical manipulation  B prone CTJ manipulation    There-ex: 20'  UBE 3' fwd, 3' bwd  Scaption IR to plinth w/ 2# DB  Scaption ER w/ 5# 2x12 B  Face pulls w/ rope and 15 # 2x15*  DNF endurance lift offs 2x12*    * = added to HEP.  Sheet with exercise descriptions and images issued.  Skilled intervention utilized in the appropriate selection & application of above exercises.  Verbal and tactile cues provided for proper form and technique.  Pt. demonstrated appropriate form & verbalized understanding of optimal technique for above exercises.        [email or web address]/  Access Code: [email or web address]    EDUCATION: Home exercise program, plan of care, activity modifications, pain management, and injury pathology       Assessment:   Patient tolerated today's session w/ expected muscle fatigue and decreased stiffness following manual. Responded well to manipulations,  "did well w/ face pulls, scaption IR and DNF lifts. Had difficulty w/ scaption ER due to tendency to perform D2 flexion, technique did not improve w/ cueing. Patient is progressing and will benefit from ongoing PT services to continue to progress scapular, cervical and cuff strengthening, as well as cervical and thoracic mobility activities as tolerated to reach established goals to return to PLOF.       Plan:  Continue manual, heavy scapular strengthening focus: Cervical isotonics w/ band, ER/IR at side, simon rows, band PA's, serratus strengthening. Assess response to manipulations and continue prn.     Goals: Set and discussed today  Active       TOS/cervical radiculopathy       LTGs (Progressing)       Start:  04/03/25    Expected End:  06/13/25       1. Demonstrate independence with home exercise program  2. Tolerate increased exercise without adverse reaction  3. Demonstrate improved posture & proper body mechanics throughout session  4. Increase cervical ROM to pain free + 45 flexion, 45 extension, 45 SB B and 90 R B to improve ADLs.  5. Increase DNF strength by at least 30\" to meet MDC  6. Decrease score of NDI by > 5 points to meet the MCID  7. Perform ADLs without an increase symptoms  8. Demonstrate decreased neural tension by having -ULTT, Rainer and Adson's tests to improve functional mobility  9. Increase gross periscapular and shoulder strength to at least 4/5 to improve functional mobility and ADLs.            Resolved       Concussion       STGs (Met)       Start:  12/09/24    Expected End:  02/04/25    Resolved:  04/03/25    1. Complete oculomotor and vestibular assessments with minimal symptoms and w/o the presence of abnormal eye movements  2. Decrease symptom score to 15 on PCSS to progress towards LTGs.  3. Demonstrate independence with home exercise program  4. Tolerate increased exercise without adverse reaction  5. Demonstrate improved posture & proper body mechanics throughout session         " "LTGs (Adequate for Discharge)       Start:  12/09/24    Expected End:  03/04/25       1. Progress through the Return to Function/Work protocols without increased symptoms  2. Complete oculomotor and vestibular assessments without increased symptoms or the presence of abnormal eye movements  3. Decrease symptom score to 0 on PCSS  Increase gross ROM to pain free + at least 65 degs flexion, 45 degs extension, 65 degs side bend B, 90 degs R B.  4. Increase deep neck flexor endurance strength by at least 30\" to meet the MDC.  5. Report decrease in pain by > 2 points to meet the MCID  6. Decrease score of NDI by > 5 points to meet the MCID                 Screening  Frequency  Date Last Completed   Spiritual and Cultural Beliefs   Screening each visit or episode of care 9/30/2024   Falls Risk Screening every ambulatory visit    Pain Screening annually at primary care visit  4/15/2025   Domestic Violence screening annually at primary care visit 9/30/2024   Depression Screening annually in the primary care setting 9/30/2024   Suicide Risk Screening annually in the primary care setting 5/2/2025   Nutrition and Food Insecurity   Screening at least annually at primary care visit     Key Learner annually in the primary care setting 9/30/2024   Drug Screen  5/2/2025 12:05 PM   Alcohol Screen  5/2/2025 12:05 PM   Advance Directive         Plan of care was developed with input and agreement by the patient      Sonido Dwyer, PT, ATC    "

## 2025-05-29 ENCOUNTER — CLINICAL SUPPORT (OUTPATIENT)
Dept: AUDIOLOGY | Facility: HOSPITAL | Age: 60
End: 2025-05-29

## 2025-05-29 DIAGNOSIS — Z01.10 ENCOUNTER FOR HEARING EXAMINATION WITHOUT ABNORMAL FINDINGS: Primary | ICD-10-CM

## 2025-05-30 ENCOUNTER — TREATMENT (OUTPATIENT)
Dept: PHYSICAL THERAPY | Facility: CLINIC | Age: 60
End: 2025-05-30
Payer: COMMERCIAL

## 2025-05-30 DIAGNOSIS — S06.0X0D CONCUSSION WITH NO LOSS OF CONSCIOUSNESS, SUBSEQUENT ENCOUNTER: ICD-10-CM

## 2025-05-30 DIAGNOSIS — M54.12 RADICULOPATHY, CERVICAL: Primary | ICD-10-CM

## 2025-05-30 DIAGNOSIS — G54.0 THORACIC OUTLET SYNDROME: ICD-10-CM

## 2025-05-30 DIAGNOSIS — M47.812 CERVICAL SPONDYLOSIS: ICD-10-CM

## 2025-05-30 PROCEDURE — 97110 THERAPEUTIC EXERCISES: CPT | Mod: GP

## 2025-05-30 PROCEDURE — 97140 MANUAL THERAPY 1/> REGIONS: CPT | Mod: GP

## 2025-05-30 ASSESSMENT — PAIN - FUNCTIONAL ASSESSMENT: PAIN_FUNCTIONAL_ASSESSMENT: 0-10

## 2025-05-30 ASSESSMENT — PAIN SCALES - GENERAL: PAINLEVEL_OUTOF10: 1

## 2025-05-30 NOTE — PROGRESS NOTES
Physical Therapy  Physical Therapy Treatment    Patient Name: Jessica Jordan  MRN: [id number]  Today's Date: 5/30/2025  Time Calculation  Start Time: 0745  Stop Time: 0830  Time Calculation (min): 45 min      PT Therapeutic Procedures Time Entry  Manual Therapy Time Entry: 15  Therapeutic Exercise Time Entry: 30          Insurance:  Visit number: 20 of 23   Authorization info: auth needed  Insurance Type: Payor:  EMPLOYEE MEDICAL PLAN / Plan:  EMPL[employer]EE [employer] / Product Type: *No Product type* /      Current Problem  1. Radiculopathy, cervical  Follow Up In Physical Therapy      2. Concussion with no loss of consciousness, subsequent encounter        3. Thoracic outlet syndrome  Follow Up In Physical Therapy      4. Cervical spondylosis  Follow Up In Physical Therapy          General:  General  Reason for Referral: Cervical radiculopathy  Referred By: Dr. Lawton    Precautions:  Precautions  Precautions Comment: anxiety, depression  Medical History Form: Reviewed (scanned into chart)    Subjective:   Patient reports she felt sore after last session but good overall. Symptoms are improving slowly.     Pain:  Pain Assessment: 0-10  0-10 (Numeric) Pain Score: 1    Objective:   ROM  *denotes end range pain/symptoms in hand updated 4/25    Cervical AROM (Degrees) updated 5/23    Flexion: 40  Extension: 40  (L) Side Bend: 40*  (R) Side Bend: 30*  (L) Rotation: 70*  (R) Rotation: 80      Shoulder AROM (Degrees) updated 4/25      (R)  (L)  Flexion: 180  180   Abduction: 180  180     Functional ER: C6  C6     Functional IR: T8  T8         Elbow AROM (Degrees)      (R)  (L)  Flexion: 135  135      Extension: 0  0           Strength Testing  Updated 5/23 HHD flexion and ER only   Shoulder    (R)  (L)  Flexion: 4.3  10.3      Abduction: 5*  5     ER:  7.2  10.5     IR:  5*  5         Elbow    (R)  (L)  Flexion: 5  5       Extension: 5  5         Periscapular Musculature    (R)  (L)  Upper  "Traps: 3  3     Middle Traps: 3  3     Lower Traps: 3  3     Rhomboids: 3  3         Posture: increased thoracic kyphosis, mild rounded shoulders       Palpation: TrP's present in shoulder girdle near TP of thoracic spine      Joint Mobility: hypomobile cervical side glides B, produces pain R       Observation: no obvious discolorations or deformities present.          Special Tests    Cervical    Cervical flexion rotation test: neg  ULTT  Median: pos  Radial: pos  Ulnar: pos  DNF endurance: 10'  Alar ligament: neg  Sharp Skyla: neg    Thoracic Outlet   Adson's Test: pos   Rainer Test: pos   Marivn's: neg       Radicular Symptoms: Yes      Outcome Measures:        Treatments:    Manual Therapy: 15'  Supine C0-C7 PA glides grade 2-3  Supine C0-C7 TP PA glides grade 2-3  Cervical side glides grade 2-3 B  B lower cervical manipulation    There-ex: 30'  UBE 3' fwd, 3' bwd  Green TB PA's 2x8-12 w/ 2-3\" holds*  Yellow stroops TB simon rows 2x12 B  Yellow perform better TB serratus wall slides 2x8-10  Self SNAGS R B w/ towel roll x10 B  Self SNAGS SB B w/ towel roll x10 B  Self SNAGS extension w/ towel roll x10    * = added to HEP.  Sheet with exercise descriptions and images issued.  Skilled intervention utilized in the appropriate selection & application of above exercises.  Verbal and tactile cues provided for proper form and technique.  Pt. demonstrated appropriate form & verbalized understanding of optimal technique for above exercises.        [email or web address]  Access Code: [email or web address]    EDUCATION: Home exercise program, plan of care, activity modifications, pain management, and injury pathology       Assessment:   Patient tolerated today's session w/ expected muscle soreness and fatigue. Responded well to manipulations, SNAGS and scapular strengthening in today's session. Patient is progressing and will benefit from ongoing PT services to continue to progress cervical mobility, scapular and " "cervical strengthening to reach established goals to return to PLOF.         Plan:  Continue manual, heavy scapular strengthening focus: Cervical isotonics w/ band, ER/IR at side and continue SNAGs. Assess response to manipulations and continue prn.     Goals: Set and discussed today  Active       TOS/cervical radiculopathy       LTGs (Progressing)       Start:  04/03/25    Expected End:  06/13/25       1. Demonstrate independence with home exercise program  2. Tolerate increased exercise without adverse reaction  3. Demonstrate improved posture & proper body mechanics throughout session  4. Increase cervical ROM to pain free + 45 flexion, 45 extension, 45 SB B and 90 R B to improve ADLs.  5. Increase DNF strength by at least 30\" to meet MDC  6. Decrease score of NDI by > 5 points to meet the MCID  7. Perform ADLs without an increase symptoms  8. Demonstrate decreased neural tension by having -ULTT, Rainer and Adson's tests to improve functional mobility  9. Increase gross periscapular and shoulder strength to at least 4/5 to improve functional mobility and ADLs.            Resolved       Concussion       STGs (Met)       Start:  12/09/24    Expected End:  02/04/25    Resolved:  04/03/25    1. Complete oculomotor and vestibular assessments with minimal symptoms and w/o the presence of abnormal eye movements  2. Decrease symptom score to 15 on PCSS to progress towards LTGs.  3. Demonstrate independence with home exercise program  4. Tolerate increased exercise without adverse reaction  5. Demonstrate improved posture & proper body mechanics throughout session         LTGs (Adequate for Discharge)       Start:  12/09/24    Expected End:  03/04/25       1. Progress through the Return to Function/Work protocols without increased symptoms  2. Complete oculomotor and vestibular assessments without increased symptoms or the presence of abnormal eye movements  3. Decrease symptom score to 0 on PCSS  Increase gross ROM to pain " "free + at least 65 degs flexion, 45 degs extension, 65 degs side bend B, 90 degs R B.  4. Increase deep neck flexor endurance strength by at least 30\" to meet the MDC.  5. Report decrease in pain by > 2 points to meet the MCID  6. Decrease score of NDI by > 5 points to meet the MCID                 Screening  Frequency  Date Last Completed   Spiritual and Cultural Beliefs   Screening each visit or episode of care 9/30/2024   Falls Risk Screening every ambulatory visit    Pain Screening annually at primary care visit  4/15/2025   Domestic Violence screening annually at primary care visit 9/30/2024   Depression Screening annually in the primary care setting 9/30/2024   Suicide Risk Screening annually in the primary care setting 5/2/2025   Nutrition and Food Insecurity   Screening at least annually at primary care visit     Key Learner annually in the primary care setting 9/30/2024   Drug Screen  5/2/2025 12:05 PM   Alcohol Screen  5/2/2025 12:05 PM   Advance Directive         Plan of care was developed with input and agreement by the patient      Sonido Dwyer, PT, ATC    "

## 2025-06-03 RX ORDER — CYCLOBENZAPRINE HCL 5 MG
5 TABLET ORAL 3 TIMES DAILY PRN
Qty: 30 TABLET | Refills: 0 | OUTPATIENT
Start: 2025-06-03 | End: 2025-06-13

## 2025-06-09 ENCOUNTER — TREATMENT (OUTPATIENT)
Dept: PHYSICAL THERAPY | Facility: CLINIC | Age: 60
End: 2025-06-09
Payer: COMMERCIAL

## 2025-06-09 DIAGNOSIS — G54.0 THORACIC OUTLET SYNDROME: ICD-10-CM

## 2025-06-09 DIAGNOSIS — M54.12 RADICULOPATHY, CERVICAL: Primary | ICD-10-CM

## 2025-06-09 DIAGNOSIS — S06.0X0D CONCUSSION WITH NO LOSS OF CONSCIOUSNESS, SUBSEQUENT ENCOUNTER: ICD-10-CM

## 2025-06-09 DIAGNOSIS — M47.812 CERVICAL SPONDYLOSIS: ICD-10-CM

## 2025-06-09 PROCEDURE — 97140 MANUAL THERAPY 1/> REGIONS: CPT | Mod: GP

## 2025-06-09 PROCEDURE — 97110 THERAPEUTIC EXERCISES: CPT | Mod: GP

## 2025-06-09 ASSESSMENT — PAIN - FUNCTIONAL ASSESSMENT: PAIN_FUNCTIONAL_ASSESSMENT: 0-10

## 2025-06-09 ASSESSMENT — PAIN SCALES - GENERAL: PAINLEVEL_OUTOF10: 3

## 2025-06-09 NOTE — PROGRESS NOTES
Physical Therapy  Physical Therapy Treatment    Patient Name: Jessica Jordan  MRN: [id number]  Today's Date: 6/9/2025  Time Calculation  Start Time: 0845  Stop Time: 0925  Time Calculation (min): 40 min      PT Therapeutic Procedures Time Entry  Manual Therapy Time Entry: 15  Therapeutic Exercise Time Entry: 25          Insurance:  Visit number: 21 of 23   Authorization info: auth needed  Insurance Type: Payor:  EMPLOYEE MEDICAL PLAN / Plan:  EMPLOYEE [employer]ICAL [employer]AN [employer] / Product Type: *No Product type* /      Current Problem  1. Radiculopathy, cervical  Follow Up In Physical Therapy      2. Concussion with no loss of consciousness, subsequent encounter        3. Thoracic outlet syndrome  Follow Up In Physical Therapy      4. Cervical spondylosis  Follow Up In Physical Therapy          General:  General  Reason for Referral: Cervical radiculopathy  Referred By: Dr. Lawton    Precautions:  Precautions  Precautions Comment: anxiety, depression  Medical History Form: Reviewed (scanned into chart)    Subjective:   Patient reports she is feeling very sore today in her neck. Has been feeling more arm symptoms the past few days and has been having trouble sleeping. Had been feeling great the past couple weeks.     Pain:  Pain Assessment: 0-10  0-10 (Numeric) Pain Score: 3    Objective:   ROM  *denotes end range pain/symptoms in hand updated 4/25    Cervical AROM (Degrees) updated 5/23    Flexion: 40  Extension: 40  (L) Side Bend: 40*  (R) Side Bend: 30*  (L) Rotation: 70*  (R) Rotation: 80      Shoulder AROM (Degrees) updated 4/25      (R)  (L)  Flexion: 180  180   Abduction: 180  180     Functional ER: C6  C6     Functional IR: T8  T8         Elbow AROM (Degrees)      (R)  (L)  Flexion: 135  135      Extension: 0  0           Strength Testing  Updated 5/23 HHD flexion and ER only  "  Shoulder    (R)  (L)  Flexion: 4.3  10.3      Abduction: 5*  5     ER:  7.2  10.5     IR:  5*  5         Elbow    (R)  (L)  Flexion: 5  5       Extension: 5  5         Periscapular Musculature    (R)  (L)  Upper Traps: 3  3     Middle Traps: 3  3     Lower Traps: 3  3     Rhomboids: 3  3         Posture: increased thoracic kyphosis, mild rounded shoulders       Palpation: TrP's present in shoulder girdle near TP of thoracic spine      Joint Mobility: hypomobile cervical side glides B, produces pain R       Observation: no obvious discolorations or deformities present.          Special Tests    Cervical    Cervical flexion rotation test: neg  ULTT  Median: pos  Radial: pos  Ulnar: pos  DNF endurance: 10'  Alar ligament: neg  Sharp Skyla: neg    Thoracic Outlet   Adson's Test: pos   Rainer Test: pos   Marvin's: neg       Radicular Symptoms: Yes      Outcome Measures:        Treatments:    Manual Therapy: 15'  STM B UT and cervical paraspinals  Supine C0-C7 PA glides grade 2-3  Supine C0-C7 TP PA glides grade 2-3  Cervical side glides grade 2-3 B  R lower cervical manipulation    There-ex: 25'  UBE 3' fwd, 3' bwd  DNF lift offs 3x10  Cervical extension isometrics w/ 4-5\" holds x10  Cervical flexion isometrics w/ 4-5\" holds x10  Cervical SB isometrics w/ 4-5\" holds x10 L  Cervical R B isometrics w/ 4-5\" holds x10  Chin tucks x10    * = added to HEP.  Sheet with exercise descriptions and images issued.  Skilled intervention utilized in the appropriate selection & application of above exercises.  Verbal and tactile cues provided for proper form and technique.  Pt. demonstrated appropriate form & verbalized understanding of optimal technique for above exercises.        [email or web address]  Access Code: [email or web address]    EDUCATION: Home exercise program, plan of care, activity modifications, pain management, and injury pathology       Assessment:   Patient tolerated today's session w/ decreased soreness " "at the conclusion . Responded well to isometrics., STM in today's session. Continues to have difficulty w/ work tasks and ADLS due to neck pain and radicular symptoms, which have been progressing. Patient will benefit from ongoing PT services to continue to progress scapular, cervical strengthening, as well as cervical and thoracic mobility as tolerated to reach established goals to return to PLOF.          Plan:  Reassess soreness and continue manual prn. Heavy scapular strengthening focus: Cervical isotonics w/ band, ER/IR at side. Recheck and update goals.     Goals: Set and discussed today  Active       TOS/cervical radiculopathy       LTGs (Progressing)       Start:  04/03/25    Expected End:  06/13/25       1. Demonstrate independence with home exercise program  2. Tolerate increased exercise without adverse reaction  3. Demonstrate improved posture & proper body mechanics throughout session  4. Increase cervical ROM to pain free + 45 flexion, 45 extension, 45 SB B and 90 R B to improve ADLs.  5. Increase DNF strength by at least 30\" to meet MDC  6. Decrease score of NDI by > 5 points to meet the MCID  7. Perform ADLs without an increase symptoms  8. Demonstrate decreased neural tension by having -ULTT, Rainer and Adson's tests to improve functional mobility  9. Increase gross periscapular and shoulder strength to at least 4/5 to improve functional mobility and ADLs.            Resolved       Concussion       STGs (Met)       Start:  12/09/24    Expected End:  02/04/25    Resolved:  04/03/25    1. Complete oculomotor and vestibular assessments with minimal symptoms and w/o the presence of abnormal eye movements  2. Decrease symptom score to 15 on PCSS to progress towards LTGs.  3. Demonstrate independence with home exercise program  4. Tolerate increased exercise without adverse reaction  5. Demonstrate improved posture & proper body mechanics throughout session         LTGs (Adequate for Discharge)       Start: " " 12/09/24    Expected End:  03/04/25       1. Progress through the Return to Function/Work protocols without increased symptoms  2. Complete oculomotor and vestibular assessments without increased symptoms or the presence of abnormal eye movements  3. Decrease symptom score to 0 on PCSS  Increase gross ROM to pain free + at least 65 degs flexion, 45 degs extension, 65 degs side bend B, 90 degs R B.  4. Increase deep neck flexor endurance strength by at least 30\" to meet the MDC.  5. Report decrease in pain by > 2 points to meet the MCID  6. Decrease score of NDI by > 5 points to meet the MCID                 Screening  Frequency  Date Last Completed   Spiritual and Cultural Beliefs   Screening each visit or episode of care 9/30/2024   Falls Risk Screening every ambulatory visit    Pain Screening annually at primary care visit  4/15/2025   Domestic Violence screening annually at primary care visit 9/30/2024   Depression Screening annually in the primary care setting 9/30/2024   Suicide Risk Screening annually in the primary care setting 5/2/2025   Nutrition and Food Insecurity   Screening at least annually at primary care visit     Key Learner annually in the primary care setting 9/30/2024   Drug Screen  5/2/2025 12:05 PM   Alcohol Screen  5/2/2025 12:05 PM   Advance Directive         Plan of care was developed with input and agreement by the patient      Sonido Dwyer, PT, ATC    "

## 2025-06-12 ENCOUNTER — APPOINTMENT (OUTPATIENT)
Dept: NEUROLOGY | Facility: CLINIC | Age: 60
End: 2025-06-12
Payer: COMMERCIAL

## 2025-06-16 ENCOUNTER — APPOINTMENT (OUTPATIENT)
Dept: PHYSICAL THERAPY | Facility: CLINIC | Age: 60
End: 2025-06-16
Payer: COMMERCIAL

## 2025-06-16 DIAGNOSIS — M54.2 NECK PAIN: ICD-10-CM

## 2025-06-16 DIAGNOSIS — M54.50 ACUTE MIDLINE LOW BACK PAIN WITHOUT SCIATICA: ICD-10-CM

## 2025-06-16 DIAGNOSIS — S06.0X0D CONCUSSION WITH NO LOSS OF CONSCIOUSNESS, SUBSEQUENT ENCOUNTER: Primary | ICD-10-CM

## 2025-06-16 DIAGNOSIS — M54.12 CERVICAL RADICULOPATHY: ICD-10-CM

## 2025-06-16 DIAGNOSIS — S06.0X0A CONCUSSION WITHOUT LOSS OF CONSCIOUSNESS, INITIAL ENCOUNTER: ICD-10-CM

## 2025-06-16 PROCEDURE — RXMED WILLOW AMBULATORY MEDICATION CHARGE

## 2025-06-16 RX ORDER — CYCLOBENZAPRINE HCL 5 MG
5 TABLET ORAL 2 TIMES DAILY PRN
Qty: 60 TABLET | Refills: 0 | Status: SHIPPED | OUTPATIENT
Start: 2025-06-16 | End: 2025-07-17

## 2025-06-17 ENCOUNTER — PHARMACY VISIT (OUTPATIENT)
Dept: PHARMACY | Facility: CLINIC | Age: 60
End: 2025-06-17
Payer: COMMERCIAL
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
## 2025-06-20 ENCOUNTER — HOSPITAL ENCOUNTER (OUTPATIENT)
Facility: HOSPITAL | Age: 60
Discharge: HOME | End: 2025-06-20
Payer: COMMERCIAL

## 2025-06-20 VITALS
DIASTOLIC BLOOD PRESSURE: 85 MMHG | RESPIRATION RATE: 18 BRPM | WEIGHT: 140 LBS | HEIGHT: 60 IN | OXYGEN SATURATION: 100 % | BODY MASS INDEX: 27.48 KG/M2 | HEART RATE: 77 BPM | SYSTOLIC BLOOD PRESSURE: 124 MMHG | TEMPERATURE: 98.6 F

## 2025-06-20 DIAGNOSIS — M54.12 CERVICAL RADICULOPATHY: ICD-10-CM

## 2025-06-20 PROCEDURE — 2500000004 HC RX 250 GENERAL PHARMACY W/ HCPCS (ALT 636 FOR OP/ED): Mod: JZ | Performed by: ANESTHESIOLOGY

## 2025-06-20 PROCEDURE — 2550000001 HC RX 255 CONTRASTS: Mod: JW | Performed by: ANESTHESIOLOGY

## 2025-06-20 RX ORDER — LIDOCAINE HYDROCHLORIDE 5 MG/ML
INJECTION, SOLUTION INFILTRATION; INTRAVENOUS
Status: COMPLETED | OUTPATIENT
Start: 2025-06-20 | End: 2025-06-20

## 2025-06-20 RX ORDER — METHYLPREDNISOLONE ACETATE 40 MG/ML
INJECTION, SUSPENSION INTRA-ARTICULAR; INTRALESIONAL; INTRAMUSCULAR; SOFT TISSUE
Status: COMPLETED | OUTPATIENT
Start: 2025-06-20 | End: 2025-06-20

## 2025-06-20 RX ADMIN — IOHEXOL 1 ML: 240 INJECTION, SOLUTION INTRATHECAL; INTRAVASCULAR; INTRAVENOUS; ORAL at 15:38

## 2025-06-20 RX ADMIN — LIDOCAINE HYDROCHLORIDE 4 ML: 5 INJECTION, SOLUTION INFILTRATION at 15:37

## 2025-06-20 RX ADMIN — METHYLPREDNISOLONE ACETATE 40 MG: 40 INJECTION, SUSPENSION INTRA-ARTICULAR; INTRALESIONAL; INTRAMUSCULAR; SOFT TISSUE at 15:38

## 2025-06-20 ASSESSMENT — PAIN SCALES - GENERAL: PAINLEVEL_OUTOF10: 1

## 2025-06-20 ASSESSMENT — COLUMBIA-SUICIDE SEVERITY RATING SCALE - C-SSRS
6. HAVE YOU EVER DONE ANYTHING, STARTED TO DO ANYTHING, OR PREPARED TO DO ANYTHING TO END YOUR LIFE?: NO
2. HAVE YOU ACTUALLY HAD ANY THOUGHTS OF KILLING YOURSELF?: NO
1. IN THE PAST MONTH, HAVE YOU WISHED YOU WERE DEAD OR WISHED YOU COULD GO TO SLEEP AND NOT WAKE UP?: NO

## 2025-06-20 ASSESSMENT — PAIN - FUNCTIONAL ASSESSMENT
PAIN_FUNCTIONAL_ASSESSMENT: WONG-BAKER FACES
PAIN_FUNCTIONAL_ASSESSMENT: WONG-BAKER FACES
PAIN_FUNCTIONAL_ASSESSMENT: 0-10
PAIN_FUNCTIONAL_ASSESSMENT: 0-10

## 2025-06-20 ASSESSMENT — PAIN SCALES - WONG BAKER
WONGBAKER_NUMERICALRESPONSE: HURTS LITTLE MORE
WONGBAKER_NUMERICALRESPONSE: HURTS LITTLE MORE

## 2025-06-20 NOTE — DISCHARGE INSTRUCTIONS
DISCHARGE INSTRUCTIONS FOR INJECTIONS     You underwent cervical interlaminar epidural steroid injection today    After most injections, it is recommended that you relax and limit your activity for the remainder of the day unless you have been told otherwise by your pain physician.  You should not drive a car, operate machinery, or make important legal decisions unless otherwise directed by your pain physician.  You may resume your normal activity, including exercise, tomorrow.      Keep a written pain diary of how much pain relief you experienced following the injection procedure and the length of time of pain relief you experienced pain relief. Following diagnostic injections like medial branch nerve blocks, sacroiliac joint blocks, stellate ganglion injections and other blocks, it is very important you record the specific amount of pain relief you experienced immediately after the injectionand how long it lasted. Your doctor will ask you for this information at your follow up visit.     For all injections, please keep the injection site dry and inspect the site for a couple of days. You may remove the Band-Aid the day of the injection at any time.     Some discomfort, bruising or slight swelling may occur at the injection site. This is not abnormal if it occurs.  If needed you may:    -Take over the counter medication such as Tylenol or Motrin.   -Apply an ice pack for 30 minutes, 2 to 3 times a day for the first 24 hours.     You may shower today; no soaking baths, hot tubs, whirlpools or swimming pools for two days.      If you are given steroids in your injection, it may take 3-5 days for the steroid medication to take effect. You may notice a worsening of your symptoms for 1-2 days after the injection. This is not abnormal.  You may use acetaminophen, ibuprofen, or prescription medication that your doctor may have prescribed for you if you need to do so.     A few common side effects of steroids include  facial flushing, sweating, restlessness, irritability,difficulty sleeping, increase in blood sugar, and increased blood pressure. If you have diabetes, please monitor your blood sugar at least once a day for at least 5 days. If you have poorly controlled high blood pressure, monitoryour blood pressure for at least 2 days and contact your primary care physician if these numbers are unusually high for you.      If you take aspirin or non-steroidal anti-inflammatory drugs (examples are Motrin, Advil, ibuprofen, Naprosyn, Voltaren, Relafen, etc.) you may restart these this evening, but stop taking it 3 days before your next appointment, unless instructed otherwiseby your physician.      You do not need to discontinue non-aspirin-containing pain medications prior to an injection (examples: Celebrex, tramadol, hydrocodone and acetaminophen).      If you take a blood thinning medication (Coumadin, Lovenox, Fragmin,Ticlid, Plavix, Pradaxa, etc.), please discuss this with your primary care physician/cardiologist and your pain physician. These medications MUST be discontinued before you can have an injection safely, without the risk of uncontrolled bleeding. If these medications are not discontinued for an appropriate period of time, you will not be able to receivean injection. Please adhere to instructions given to you about when to restart your blood thinning medication. If you have any questions please reach out to our team.    If you are taking Coumadin, please have your INR checked the morning of your procedure and bring the result to your appointment unless otherwise instructed. If your INR is over 1.2, your injection may need to be rescheduled to avoid uncontrolled bleeding from the needle placement.     Call [facility]  and ask for Pain Management at [phone] between 8am-4pm Monday - Friday if you are experiencing the following:    If you received an epidural or spinal injection:    -Headache that doesnot go away  with medicine, is worse when sitting or standing up, and is greatly relieved upon lying down.   -Severe pain worse than or different than your baseline pain.   -Chills or fever (101º F or greater).   -Drainage or signs of infection at the injection site     Go directly to the Emergency Department if you are experiencing the following and received an epidural or spinal injection:   -Abrupt weakness or progressive weakness in your legs that starts after you leave the clinic.   -Abrupt severe or worsening numbness in your legs.   -Inability to urinate after the injection or loss of bowel or bladder control without the urge to defecate or urinate.     If you have a clinical question that cannot wait until your next appointment, please call [phone] between 8am-4pm Monday - Friday or send a [employer] message. We do our best to return all non-emergency messages within 24 hours, Monday - Friday. A nurse or physician will return your message. You may also try calling and they will do their best to answer your question(s):  - Dr. Driss Still's nurse ([phone])  - Dr. Patrick Madrigal/Dr. Granda's nurse ([phone])  - Dr. Julia Luna/Maribel's nurse ([phone])     If you need to cancel an appointment, please call the scheduling staff at [phone] during normal business hours or leave a message at least 24 hours in advance.     If you are going to be sedated for your next procedure, you MUST have responsible adult who can legally drive accompany you home. You cannot eat or drink for at least eight hours prior to the planned procedure if you are going to receive sedation. You may take your non-blood thinning medications with a small sip of water.

## 2025-06-25 DIAGNOSIS — Z13.1 SCREENING FOR DIABETES MELLITUS: ICD-10-CM

## 2025-06-25 DIAGNOSIS — E55.9 VITAMIN D INSUFFICIENCY: ICD-10-CM

## 2025-06-25 DIAGNOSIS — E78.5 DYSLIPIDEMIA: ICD-10-CM

## 2025-06-25 DIAGNOSIS — Z00.00 PHYSICAL EXAM: Primary | ICD-10-CM

## 2025-06-25 DIAGNOSIS — Z13.6 SCREENING FOR CARDIOVASCULAR CONDITION: ICD-10-CM

## 2025-06-26 ENCOUNTER — TREATMENT (OUTPATIENT)
Dept: PHYSICAL THERAPY | Facility: CLINIC | Age: 60
End: 2025-06-26
Payer: COMMERCIAL

## 2025-06-26 DIAGNOSIS — G54.0 THORACIC OUTLET SYNDROME: ICD-10-CM

## 2025-06-26 DIAGNOSIS — M47.812 CERVICAL SPONDYLOSIS: ICD-10-CM

## 2025-06-26 DIAGNOSIS — M54.12 RADICULOPATHY, CERVICAL: Primary | ICD-10-CM

## 2025-06-26 DIAGNOSIS — S06.0X0D CONCUSSION WITH NO LOSS OF CONSCIOUSNESS, SUBSEQUENT ENCOUNTER: ICD-10-CM

## 2025-06-26 PROCEDURE — 97140 MANUAL THERAPY 1/> REGIONS: CPT | Mod: GP

## 2025-06-26 PROCEDURE — 97110 THERAPEUTIC EXERCISES: CPT | Mod: GP

## 2025-06-26 ASSESSMENT — PAIN - FUNCTIONAL ASSESSMENT: PAIN_FUNCTIONAL_ASSESSMENT: 0-10

## 2025-06-26 ASSESSMENT — PROMIS GLOBAL HEALTH SCALE
RATE_PHYSICAL_HEALTH: VERY GOOD
CARRYOUT_SOCIAL_ACTIVITIES: VERY GOOD
RATE_AVERAGE_PAIN: 2
RATE_GENERAL_HEALTH: VERY GOOD
EMOTIONAL_PROBLEMS: NEVER
RATE_AVERAGE_FATIGUE: MILD
CARRYOUT_PHYSICAL_ACTIVITIES: COMPLETELY
RATE_QUALITY_OF_LIFE: EXCELLENT
RATE_SOCIAL_SATISFACTION: EXCELLENT
RATE_MENTAL_HEALTH: EXCELLENT

## 2025-06-26 ASSESSMENT — PAIN SCALES - GENERAL: PAINLEVEL_OUTOF10: 3

## 2025-06-26 NOTE — PROGRESS NOTES
Physical Therapy  Physical Therapy Treatment    Patient Name: Jessica Jordan  MRN: [id number]  Today's Date: 6/26/2025  Time Calculation  Start Time: 0745  Stop Time: 0830  Time Calculation (min): 45 min      PT Therapeutic Procedures Time Entry  Therapeutic Exercise Time Entry: 13  Manual Therapy Time Entry: 28          Insurance:  Visit number: 22 of 23   Authorization info: auth needed  Insurance Type: Payor:  EMPLOYEE MEDICAL PLAN / Plan:  EMPLOYEE MEDICAL PLAN CONSUMER [employer] / Product Type: *No Product type* /      Current Problem  1. Radiculopathy, cervical  Follow Up In Physical Therapy      2. Concussion with no loss of consciousness, subsequent encounter        3. Thoracic outlet syndrome  Follow Up In Physical Therapy      4. Cervical spondylosis  Follow Up In Physical Therapy          General:  General  Reason for Referral: Cervical radiculopathy  Referred By: Dr. aLwton    Precautions:  Precautions  Precautions Comment: anxiety, depression  Medical History Form: Reviewed (scanned into chart)    Subjective:   Patient reports she is doing well overall. Received an injection from pain management which has given some relief of symptoms, not as much as her first.     Pain:  Pain Assessment: 0-10  0-10 (Numeric) Pain Score: 3    Objective:   ROM  *denotes end range pain/symptoms in hand updated 4/25    Cervical AROM (Degrees) updated 6/26    Flexion: 40  Extension: 40  (L) Side Bend: 40*  (R) Side Bend: 30*  (L) Rotation: 70*  (R) Rotation: 80      Shoulder AROM (Degrees) updated 4/25      (R)  (L)  Flexion: 180  180   Abduction: 180  180     Functional ER: C6  C6     Functional IR: T8  T8         Elbow AROM (Degrees)      (R)  (L)  Flexion: 135  135      Extension: 0  0           Strength Testing  Updated 5/23 HHD flexion and ER only    Shoulder    (R)  (L)  Flexion: 4.3  10.3      Abduction: 5*  5     ER:  7.2  10.5     IR:  5*  5         Elbow    (R)  (L)  Flexion: 5  5       Extension: 5  5         Periscapular Musculature    (R)  (L)  Upper Traps: 3  3     Middle Traps: 3  3     Lower Traps: 3  3     Rhomboids: 3  3         Posture: increased thoracic kyphosis, mild rounded shoulders       Palpation: TrP's present in shoulder girdle near TP of thoracic spine      Joint Mobility: hypomobile cervical side glides B, produces pain R       Observation: no obvious discolorations or deformities present.          Special Tests    Cervical    Cervical flexion rotation test: neg  ULTT  Median: pos  Radial: pos  Ulnar: pos  DNF endurance: 10'   Alar ligament: neg  Sharp Skyla: neg    Thoracic Outlet   Adson's Test: pos   Rainer Test: pos   Marvin's: neg       Radicular Symptoms: Yes      Outcome Measures:        Treatments:    Manual Therapy: 28'  STM/TrP B UT and cervical paraspinals  Supine C0-C7 PA glides grade 2-3  Supine C0-C7 TP PA glides grade 2-3  Cervical side glides grade 2-3 B    There-ex: 13'  UBE 3' fwd, 3' bwd  Yellow TB ER at side 2x15  Yellow TB horizontal adduction 2x15  Yellow TB cervical SB B w/ iron neck 2x10 each  Red TB cervical lower extension 2x10      [email or web address]  Access Code: [email or web address]    EDUCATION: Home exercise program, plan of care, activity modifications, pain management, and injury pathology       Assessment:   Patient tolerated today's session w/ expected muscle soreness. Patient's progress has plateau 'd, will be pausing formal therapy at this time. She will be meeting w/ another spine ortho for a 2nd opinion moving forward.          Plan:  Discharge outpatient PT     Discharge Summary    Name: Jessica Jordan  MRN: [id number]  : 1965  Date: 25    Discharge Summary: PT    Discharge Information: Date of discharge 2025, Date of last visit 2025, Date of evaluation  "12/09/2024, Number of attended visits 22, Referred by Dr. Lawton, and Referred for Concussion w/o LOC and cervical radiculopathy    Therapy Summary: Patient attended 22 PT visits including her initial evaluation for a concussion w/o LOC and cervical radiculopathy. Treatment consisted of there-ex and manual therapy to improve cervical ROM, balance, scapular and cervical strength, as well as VOR exercises. Patient achieved all concussion goals, progress plateau'd on cervical radiculopathy. Will be following w/ spine ortho.     Discharge Status: discharged to self care.      Rehab Discharge Reason: Progress plateaued; further improvement possible        Goals: Set and discussed today  Resolved       Concussion       STGs (Met)       Start:  12/09/24    Expected End:  02/04/25    Resolved:  04/03/25    1. Complete oculomotor and vestibular assessments with minimal symptoms and w/o the presence of abnormal eye movements  2. Decrease symptom score to 15 on PCSS to progress towards LTGs.  3. Demonstrate independence with home exercise program  4. Tolerate increased exercise without adverse reaction  5. Demonstrate improved posture & proper body mechanics throughout session         LTGs (Adequate for Discharge)       Start:  12/09/24    Expected End:  03/04/25       1. Progress through the Return to Function/Work protocols without increased symptoms  2. Complete oculomotor and vestibular assessments without increased symptoms or the presence of abnormal eye movements  3. Decrease symptom score to 0 on PCSS  Increase gross ROM to pain free + at least 65 degs flexion, 45 degs extension, 65 degs side bend B, 90 degs R B.  4. Increase deep neck flexor endurance strength by at least 30\" to meet the MDC.  5. Report decrease in pain by > 2 points to meet the MCID  6. Decrease score of NDI by > 5 points to meet the MCID               TOS/cervical radiculopathy       LTGs (Not met)       Start:  04/03/25    Expected End:  06/13/25  " "  Resolved:  06/26/25    1. Demonstrate independence with home exercise program  2. Tolerate increased exercise without adverse reaction  3. Demonstrate improved posture & proper body mechanics throughout session  4. Increase cervical ROM to pain free + 45 flexion, 45 extension, 45 SB B and 90 R B to improve ADLs.  5. Increase DNF strength by at least 30\" to meet MDC  6. Decrease score of NDI by > 5 points to meet the MCID  7. Perform ADLs without an increase symptoms  8. Demonstrate decreased neural tension by having -ULTT, Rainer and Adson's tests to improve functional mobility  9. Increase gross periscapular and shoulder strength to at least 4/5 to improve functional mobility and ADLs.               Screening  Frequency  Date Last Completed   Spiritual and Cultural Beliefs   Screening each visit or episode of care 9/30/2024   Falls Risk Screening every ambulatory visit    Pain Screening annually at primary care visit  4/15/2025   Domestic Violence screening annually at primary care visit 9/30/2024   Depression Screening annually in the primary care setting 9/30/2024   Suicide Risk Screening annually in the primary care setting 6/20/2025   Nutrition and Food Insecurity   Screening at least annually at primary care visit     Key Learner annually in the primary care setting 9/30/2024   Drug Screen  6/20/2025  3:08 PM   Alcohol Screen  6/20/2025  3:08 PM   Advance Directive         Plan of care was developed with input and agreement by the patient      Sonido Dwyer, PT, ATC    "

## 2025-06-28 LAB
25(OH)D3+25(OH)D2 SERPL-MCNC: 41 NG/ML (ref 30–100)
ALBUMIN SERPL-MCNC: 4.6 G/DL (ref 3.6–5.1)
ALP SERPL-CCNC: 66 U/L (ref 37–153)
ALT SERPL-CCNC: 27 U/L (ref 6–29)
ANION GAP SERPL CALCULATED.4IONS-SCNC: 8 MMOL/L (CALC) (ref 7–17)
AST SERPL-CCNC: 23 U/L (ref 10–35)
BILIRUB SERPL-MCNC: 0.4 MG/DL (ref 0.2–1.2)
BUN SERPL-MCNC: 18 MG/DL (ref 7–25)
CALCIUM SERPL-MCNC: 9.8 MG/DL (ref 8.6–10.4)
CHLORIDE SERPL-SCNC: 105 MMOL/L (ref 98–110)
CHOLEST SERPL-MCNC: 260 MG/DL
CHOLEST/HDLC SERPL: 4.5 (CALC)
CO2 SERPL-SCNC: 29 MMOL/L (ref 20–32)
CREAT SERPL-MCNC: 0.97 MG/DL (ref 0.5–1.05)
EGFRCR SERPLBLD CKD-EPI 2021: 67 ML/MIN/1.73M2
ERYTHROCYTE [DISTWIDTH] IN BLOOD BY AUTOMATED COUNT: 14.4 % (ref 11–15)
EST. AVERAGE GLUCOSE BLD GHB EST-MCNC: 103 MG/DL
EST. AVERAGE GLUCOSE BLD GHB EST-SCNC: 5.7 MMOL/L
GLUCOSE SERPL-MCNC: 85 MG/DL (ref 65–99)
HBA1C MFR BLD: 5.2 %
HCT VFR BLD AUTO: 44.3 % (ref 35–45)
HDLC SERPL-MCNC: 58 MG/DL
HGB BLD-MCNC: 14.4 G/DL (ref 11.7–15.5)
LDLC SERPL CALC-MCNC: 160 MG/DL (CALC)
MCH RBC QN AUTO: 29 PG (ref 27–33)
MCHC RBC AUTO-ENTMCNC: 32.5 G/DL (ref 32–36)
MCV RBC AUTO: 89.3 FL (ref 80–100)
NONHDLC SERPL-MCNC: 202 MG/DL (CALC)
PLATELET # BLD AUTO: 188 THOUSAND/UL (ref 140–400)
PMV BLD REES-ECKER: 7.6 FL (ref 7.5–12.5)
POTASSIUM SERPL-SCNC: 4.1 MMOL/L (ref 3.5–5.3)
PROT SERPL-MCNC: 7.3 G/DL (ref 6.1–8.1)
RBC # BLD AUTO: 4.96 MILLION/UL (ref 3.8–5.1)
SODIUM SERPL-SCNC: 142 MMOL/L (ref 135–146)
TRIGL SERPL-MCNC: 259 MG/DL
WBC # BLD AUTO: 6.3 THOUSAND/UL (ref 3.8–10.8)

## 2025-07-01 PROCEDURE — RXMED WILLOW AMBULATORY MEDICATION CHARGE

## 2025-07-03 ENCOUNTER — PHARMACY VISIT (OUTPATIENT)
Dept: PHARMACY | Facility: CLINIC | Age: 60
End: 2025-07-03
Payer: COMMERCIAL

## 2025-07-03 ENCOUNTER — APPOINTMENT (OUTPATIENT)
Dept: PRIMARY CARE | Facility: CLINIC | Age: 60
End: 2025-07-03
Payer: COMMERCIAL

## 2025-07-03 VITALS
BODY MASS INDEX: 25.71 KG/M2 | HEART RATE: 85 BPM | HEIGHT: 61 IN | TEMPERATURE: 97.1 F | SYSTOLIC BLOOD PRESSURE: 125 MMHG | WEIGHT: 136.2 LBS | DIASTOLIC BLOOD PRESSURE: 82 MMHG

## 2025-07-03 DIAGNOSIS — M47.812 CERVICAL SPONDYLOSIS: ICD-10-CM

## 2025-07-03 DIAGNOSIS — L70.8 OTHER ACNE: ICD-10-CM

## 2025-07-03 DIAGNOSIS — F41.1 GENERALIZED ANXIETY DISORDER: ICD-10-CM

## 2025-07-03 DIAGNOSIS — E78.5 DYSLIPIDEMIA: ICD-10-CM

## 2025-07-03 DIAGNOSIS — Z00.00 ANNUAL PHYSICAL EXAM: Primary | ICD-10-CM

## 2025-07-03 DIAGNOSIS — M54.12 RADICULOPATHY, CERVICAL: ICD-10-CM

## 2025-07-03 DIAGNOSIS — Z12.31 SCREENING MAMMOGRAM FOR BREAST CANCER: ICD-10-CM

## 2025-07-03 PROCEDURE — 3008F BODY MASS INDEX DOCD: CPT | Performed by: INTERNAL MEDICINE

## 2025-07-03 PROCEDURE — 99396 PREV VISIT EST AGE 40-64: CPT | Performed by: INTERNAL MEDICINE

## 2025-07-03 PROCEDURE — RXMED WILLOW AMBULATORY MEDICATION CHARGE

## 2025-07-03 PROCEDURE — 1036F TOBACCO NON-USER: CPT | Performed by: INTERNAL MEDICINE

## 2025-07-03 RX ORDER — CLINDAMYCIN AND BENZOYL PEROXIDE 1 %-5 %
KIT TOPICAL
Qty: 35 G | Refills: 5 | Status: SHIPPED | OUTPATIENT
Start: 2025-07-03

## 2025-07-03 ASSESSMENT — ENCOUNTER SYMPTOMS
FEVER: 0
COUGH: 0
POLYDIPSIA: 0
PALPITATIONS: 0
SHORTNESS OF BREATH: 0
CHILLS: 0
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
## 2025-07-08 ENCOUNTER — PHARMACY VISIT (OUTPATIENT)
Dept: PHARMACY | Facility: CLINIC | Age: 60
End: 2025-07-08
Payer: COMMERCIAL

## 2025-07-09 ENCOUNTER — APPOINTMENT (OUTPATIENT)
Dept: ORTHOPEDIC SURGERY | Facility: CLINIC | Age: 60
End: 2025-07-09
Payer: COMMERCIAL

## 2025-07-09 ENCOUNTER — APPOINTMENT (OUTPATIENT)
Dept: NEUROLOGY | Facility: CLINIC | Age: 60
End: 2025-07-09
Payer: COMMERCIAL

## 2025-07-09 DIAGNOSIS — M54.12 CERVICAL RADICULOPATHY: Primary | ICD-10-CM

## 2025-07-09 PROCEDURE — 99214 OFFICE O/P EST MOD 30 MIN: CPT | Performed by: ORTHOPAEDIC SURGERY

## 2025-07-09 ASSESSMENT — PAIN - FUNCTIONAL ASSESSMENT: PAIN_FUNCTIONAL_ASSESSMENT: 0-10

## 2025-07-09 ASSESSMENT — PAIN SCALES - GENERAL: PAINLEVEL_OUTOF10: 2
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
## 2025-07-11 ENCOUNTER — APPOINTMENT (OUTPATIENT)
Dept: PHYSICAL THERAPY | Facility: CLINIC | Age: 60
End: 2025-07-11
Payer: COMMERCIAL

## 2025-07-11 DIAGNOSIS — S06.0X0D CONCUSSION WITH NO LOSS OF CONSCIOUSNESS, SUBSEQUENT ENCOUNTER: Primary | ICD-10-CM

## 2025-07-21 ENCOUNTER — APPOINTMENT (OUTPATIENT)
Dept: RADIOLOGY | Facility: HOSPITAL | Age: 60
End: 2025-07-21
Payer: COMMERCIAL

## 2025-07-21 DIAGNOSIS — Z12.31 SCREENING MAMMOGRAM FOR BREAST CANCER: ICD-10-CM

## 2025-07-21 PROCEDURE — 77063 BREAST TOMOSYNTHESIS BI: CPT

## 2025-07-21 PROCEDURE — 77067 SCR MAMMO BI INCL CAD: CPT | Performed by: RADIOLOGY

## 2025-07-21 PROCEDURE — 77063 BREAST TOMOSYNTHESIS BI: CPT | Performed by: RADIOLOGY

## 2025-07-31 ENCOUNTER — PHARMACY VISIT (OUTPATIENT)
Dept: PHARMACY | Facility: CLINIC | Age: 60
End: 2025-07-31
Payer: COMMERCIAL

## 2025-07-31 DIAGNOSIS — S06.0X0A CONCUSSION WITHOUT LOSS OF CONSCIOUSNESS, INITIAL ENCOUNTER: ICD-10-CM

## 2025-07-31 DIAGNOSIS — M54.50 ACUTE MIDLINE LOW BACK PAIN WITHOUT SCIATICA: ICD-10-CM

## 2025-07-31 DIAGNOSIS — M54.2 NECK PAIN: ICD-10-CM

## 2025-07-31 PROCEDURE — RXMED WILLOW AMBULATORY MEDICATION CHARGE

## 2025-07-31 RX ORDER — CYCLOBENZAPRINE HCL 5 MG
5 TABLET ORAL 2 TIMES DAILY PRN
Qty: 60 TABLET | Refills: 0 | Status: SHIPPED | OUTPATIENT
Start: 2025-07-31 | End: 2025-08-30

## 2025-08-27 DIAGNOSIS — M54.2 NECK PAIN: ICD-10-CM

## 2025-08-27 DIAGNOSIS — M54.50 ACUTE MIDLINE LOW BACK PAIN WITHOUT SCIATICA: ICD-10-CM

## 2025-08-27 DIAGNOSIS — S06.0X0A CONCUSSION WITHOUT LOSS OF CONSCIOUSNESS, INITIAL ENCOUNTER: ICD-10-CM

## 2025-08-27 PROCEDURE — RXMED WILLOW AMBULATORY MEDICATION CHARGE

## 2025-08-28 ENCOUNTER — APPOINTMENT (OUTPATIENT)
Dept: OBSTETRICS AND GYNECOLOGY | Facility: CLINIC | Age: 60
End: 2025-08-28
Payer: COMMERCIAL

## 2025-08-28 VITALS
BODY MASS INDEX: 24.81 KG/M2 | DIASTOLIC BLOOD PRESSURE: 72 MMHG | WEIGHT: 131.4 LBS | SYSTOLIC BLOOD PRESSURE: 116 MMHG | HEIGHT: 61 IN

## 2025-08-28 DIAGNOSIS — Z12.31 BREAST CANCER SCREENING BY MAMMOGRAM: ICD-10-CM

## 2025-08-28 DIAGNOSIS — Z01.419 WELL WOMAN EXAM WITH ROUTINE GYNECOLOGICAL EXAM: Primary | ICD-10-CM

## 2025-08-28 PROCEDURE — 3008F BODY MASS INDEX DOCD: CPT | Performed by: OBSTETRICS & GYNECOLOGY

## 2025-08-28 PROCEDURE — 1036F TOBACCO NON-USER: CPT | Performed by: OBSTETRICS & GYNECOLOGY

## 2025-08-28 PROCEDURE — RXMED WILLOW AMBULATORY MEDICATION CHARGE

## 2025-08-28 PROCEDURE — 99396 PREV VISIT EST AGE 40-64: CPT | Performed by: OBSTETRICS & GYNECOLOGY

## 2025-08-28 RX ORDER — CYCLOBENZAPRINE HCL 5 MG
5 TABLET ORAL 2 TIMES DAILY PRN
Qty: 60 TABLET | Refills: 0 | Status: SHIPPED | OUTPATIENT
Start: 2025-08-28 | End: 2025-09-27

## 2025-08-28 ASSESSMENT — ENCOUNTER SYMPTOMS
HEMATOLOGIC/LYMPHATIC NEGATIVE: 0
MUSCULOSKELETAL NEGATIVE: 0
PSYCHIATRIC NEGATIVE: 0
CARDIOVASCULAR NEGATIVE: 0
EYES NEGATIVE: 0
ENDOCRINE NEGATIVE: 0
GASTROINTESTINAL NEGATIVE: 0
NEUROLOGICAL NEGATIVE: 0
CONSTITUTIONAL NEGATIVE: 0
RESPIRATORY NEGATIVE: 0
ALLERGIC/IMMUNOLOGIC NEGATIVE: 0

## 2025-08-28 ASSESSMENT — PATIENT HEALTH QUESTIONNAIRE - PHQ9
1. LITTLE INTEREST OR PLEASURE IN DOING THINGS: NOT AT ALL
SUM OF ALL RESPONSES TO PHQ9 QUESTIONS 1 & 2: 0
2. FEELING DOWN, DEPRESSED OR HOPELESS: NOT AT ALL

## 2025-08-28 ASSESSMENT — PAIN SCALES - GENERAL: PAINLEVEL_OUTOF10: 0-NO PAIN

## 2025-09-02 ENCOUNTER — PHARMACY VISIT (OUTPATIENT)
Dept: PHARMACY | Facility: CLINIC | Age: 60
End: 2025-09-02
Payer: COMMERCIAL

## 2026-05-04 ENCOUNTER — APPOINTMENT (OUTPATIENT)
Dept: OPHTHALMOLOGY | Facility: CLINIC | Age: 61
End: 2026-05-04
Payer: COMMERCIAL